# Patient Record
Sex: MALE | Race: BLACK OR AFRICAN AMERICAN | NOT HISPANIC OR LATINO | Employment: FULL TIME | URBAN - METROPOLITAN AREA
[De-identification: names, ages, dates, MRNs, and addresses within clinical notes are randomized per-mention and may not be internally consistent; named-entity substitution may affect disease eponyms.]

---

## 2018-08-28 ENCOUNTER — OFFICE VISIT (OUTPATIENT)
Dept: INTERNAL MEDICINE | Facility: CLINIC | Age: 43
End: 2018-08-28

## 2018-08-28 VITALS
SYSTOLIC BLOOD PRESSURE: 140 MMHG | OXYGEN SATURATION: 99 % | HEART RATE: 73 BPM | DIASTOLIC BLOOD PRESSURE: 80 MMHG | BODY MASS INDEX: 32.78 KG/M2 | HEIGHT: 66 IN | WEIGHT: 204 LBS

## 2018-08-28 DIAGNOSIS — R51.9 NEW ONSET OF HEADACHES: ICD-10-CM

## 2018-08-28 DIAGNOSIS — Z00.00 HEALTH CARE MAINTENANCE: Primary | ICD-10-CM

## 2018-08-28 DIAGNOSIS — Z82.49 FAMILY HISTORY OF BRAIN ANEURYSM: ICD-10-CM

## 2018-08-28 DIAGNOSIS — N52.9 ERECTILE DYSFUNCTION, UNSPECIFIED ERECTILE DYSFUNCTION TYPE: ICD-10-CM

## 2018-08-28 PROCEDURE — 99396 PREV VISIT EST AGE 40-64: CPT | Performed by: PHYSICIAN ASSISTANT

## 2018-08-28 NOTE — PROGRESS NOTES
"Chief Complaint   Patient presents with   • Establish Care       Subjective   Zuhair Jade is a 42 y.o. male.       History of Present Illness     The patient is being seen for a health maintenance evaluation.  The last health maintenance was unknown year(s) ago.    Social History  Zuhair  does not smoke cigarettes.   He drinks no alcohol. History of alcoholism, currently 4 years sober.   He does not use illicit drugs.    General History  Zuhair  does have regular dental visits.  He does complain of vision problems. Wears glasses and contacts.  Last eye exam was 2/2018  Immunizations are up to date. The patient needs the following immunizations: Had TDaP this year    Lifestyle  Zuhair  consumes a in general, an \"unhealthy\" diet.  He exercises intermittently.    Reproductive Health  Zuhair  is not sexually active. His contraceptive plan is condoms.   He does have erectile dysfunction. Has noticed that he has decreased libido, had caused problems with having relationship. Has issues keeping erection.    Screening  Last PSA was never.  Last prostate exam was  Never. No history of prostate cancer in family.  Last testicular exam was never.  Last colonoscopy was never. Grandmother had colonoscopy in her 50s or 60s. Mom has had colonoscopy.    Mother had brain aneurysm, he has been told it runs in his family. Mom had surgery 50s.    Notes that over the past few months he has had onset of  brief headaches on the side of his face, both sides. Concern d/t history of aneurysms in family. Lasts for a minute or so.              No current outpatient prescriptions on file.     [facility]  The following portions of the patient's history were reviewed and updated as appropriate: allergies, current medications, past family history, past medical history, past social history, past surgical history and problem list.    Review of Systems   Constitutional: Negative for appetite change, fever and unexpected weight change.   HENT: " "Negative for ear pain, facial swelling and sore throat.    Eyes: Negative for pain and visual disturbance.   Respiratory: Negative for chest tightness, shortness of breath and wheezing.    Cardiovascular: Negative for chest pain and palpitations.   Gastrointestinal: Negative for abdominal pain and blood in stool.   Endocrine: Negative.    Genitourinary: Negative for difficulty urinating and hematuria.   Musculoskeletal: Negative for joint swelling.   Neurological: Negative for tremors, seizures and syncope.   Hematological: Negative for adenopathy.   Psychiatric/Behavioral: Negative.        Objective   /80   Pulse 73   Ht 168 cm (66.14\")   Wt 92.5 kg (204 lb)   SpO2 99%   BMI 32.79 kg/m²     Physical Exam   Constitutional: He is oriented to person, place, and time. He appears well-developed and well-nourished. No distress.   HENT:   Head: Normocephalic and atraumatic. Hair is normal.   Right Ear: Hearing, tympanic membrane, external ear and ear canal normal.   Left Ear: Hearing, tympanic membrane, external ear and ear canal normal.   Nose: No sinus tenderness or nasal deformity.   Mouth/Throat: Uvula is midline, oropharynx is clear and moist and mucous membranes are normal. No oral lesions. No uvula swelling.   Eyes: Pupils are equal, round, and reactive to light. Conjunctivae, EOM and lids are normal. Right eye exhibits no discharge. Left eye exhibits no discharge. No scleral icterus. Right eye exhibits normal extraocular motion and no nystagmus. Left eye exhibits normal extraocular motion and no nystagmus.   Fundoscopic exam:       The right eye shows red reflex.        The left eye shows red reflex.   Neck: Normal range of motion. Neck supple. No JVD present. No tracheal deviation present. No thyromegaly present.   Cardiovascular: Normal rate, regular rhythm, normal heart sounds, intact distal pulses and normal pulses.  Exam reveals no gallop.    No murmur heard.  Pulmonary/Chest: Effort normal and " breath sounds normal. No respiratory distress. He has no wheezes. He has no rales. He exhibits no tenderness.   Abdominal: Soft. Bowel sounds are normal. He exhibits no distension and no mass. There is no tenderness. There is no guarding. No hernia.   Genitourinary: Rectum normal and prostate normal.   Musculoskeletal: Normal range of motion. He exhibits no edema, tenderness or deformity.   Lymphadenopathy:     He has no cervical adenopathy.   Neurological: He is alert and oriented to person, place, and time. He has normal reflexes. He displays normal reflexes. No cranial nerve deficit. He exhibits normal muscle tone. Coordination normal.   Skin: Skin is warm and dry. No rash noted. He is not diaphoretic.   Psychiatric: He has a normal mood and affect. His behavior is normal. Judgment and thought content normal.   Nursing note and vitals reviewed.      No results found for this or any previous visit.     ASSESSMENT/PLAN    Problem List Items Addressed This Visit        Other    Health care maintenance - Primary     Immunizations: up to date  Eye exam: done 2/2018  Prostate exam: done today  PSA: ordered  Colonoscopy: due age 43  Labs: fasting labs ordered           Relevant Orders    CBC & Differential    Comprehensive Metabolic Panel    Lipid Panel    TSH    Vitamin D 25 Hydroxy      Other Visit Diagnoses     Erectile dysfunction, unspecified erectile dysfunction type        Relevant Orders    Testosterone    New onset of headaches        Relevant Orders    MRI Brain With & Without Contrast    Family history of brain aneurysm        Relevant Orders    MRI Brain With & Without Contrast               Return in about 1 year (around 8/28/2019) for Annual.

## 2018-08-30 PROBLEM — Z00.00 HEALTH CARE MAINTENANCE: Status: ACTIVE | Noted: 2018-08-30

## 2018-09-04 ENCOUNTER — HOSPITAL ENCOUNTER (OUTPATIENT)
Dept: MRI IMAGING | Facility: HOSPITAL | Age: 43
Discharge: HOME | End: 2018-09-04
Admitting: PHYSICIAN ASSISTANT

## 2018-09-04 DIAGNOSIS — Z82.49 FAMILY HISTORY OF BRAIN ANEURYSM: ICD-10-CM

## 2018-09-04 DIAGNOSIS — R51.9 NEW ONSET OF HEADACHES: ICD-10-CM

## 2018-09-04 PROCEDURE — 0 GADOBENATE DIMEGLUMINE 529 MG/ML SOLUTION: Performed by: PHYSICIAN ASSISTANT

## 2018-09-04 PROCEDURE — 70553 MRI BRAIN STEM W/O & W/DYE: CPT

## 2018-09-04 PROCEDURE — A9577 INJ MULTIHANCE: HCPCS | Performed by: PHYSICIAN ASSISTANT

## 2018-09-04 RX ADMIN — GADOBENATE DIMEGLUMINE 19 ML: 529 INJECTION, SOLUTION INTRAVENOUS at 15:28

## 2018-09-27 ENCOUNTER — LAB (OUTPATIENT)
Dept: INTERNAL MEDICINE | Facility: CLINIC | Age: 43
End: 2018-09-27

## 2018-09-27 DIAGNOSIS — Z00.00 HEALTH CARE MAINTENANCE: ICD-10-CM

## 2018-09-27 DIAGNOSIS — N52.9 ERECTILE DYSFUNCTION, UNSPECIFIED ERECTILE DYSFUNCTION TYPE: ICD-10-CM

## 2018-09-27 LAB
25(OH)D3 SERPL-MCNC: 19.5 NG/ML
ALBUMIN SERPL-MCNC: 4.18 G/DL (ref 3.2–4.8)
ALBUMIN/GLOB SERPL: 2.4 G/DL (ref 1.5–2.5)
ALP SERPL-CCNC: 86 U/L (ref 25–100)
ALT SERPL W P-5'-P-CCNC: 49 U/L (ref 7–40)
ANION GAP SERPL CALCULATED.3IONS-SCNC: 10 MMOL/L (ref 3–11)
ARTICHOKE IGE QN: 150 MG/DL (ref 0–130)
AST SERPL-CCNC: 24 U/L (ref 0–33)
BASOPHILS # BLD AUTO: 0.02 10*3/MM3 (ref 0–0.2)
BASOPHILS NFR BLD AUTO: 0.3 % (ref 0–1)
BILIRUB SERPL-MCNC: 0.3 MG/DL (ref 0.3–1.2)
BUN BLD-MCNC: 24 MG/DL (ref 9–23)
BUN/CREAT SERPL: 19.8 (ref 7–25)
CALCIUM SPEC-SCNC: 9 MG/DL (ref 8.7–10.4)
CHLORIDE SERPL-SCNC: 102 MMOL/L (ref 99–109)
CHOLEST SERPL-MCNC: 208 MG/DL (ref 0–200)
CO2 SERPL-SCNC: 26 MMOL/L (ref 20–31)
CREAT BLD-MCNC: 1.21 MG/DL (ref 0.6–1.3)
DEPRECATED RDW RBC AUTO: 43.4 FL (ref 37–54)
EOSINOPHIL # BLD AUTO: 0.07 10*3/MM3 (ref 0–0.3)
EOSINOPHIL NFR BLD AUTO: 1.2 % (ref 0–3)
ERYTHROCYTE [DISTWIDTH] IN BLOOD BY AUTOMATED COUNT: 14.5 % (ref 11.3–14.5)
GFR SERPL CREATININE-BSD FRML MDRD: 79 ML/MIN/1.73
GLOBULIN UR ELPH-MCNC: 1.7 GM/DL
GLUCOSE BLD-MCNC: 112 MG/DL (ref 70–100)
HCT VFR BLD AUTO: 44.5 % (ref 38.9–50.9)
HDLC SERPL-MCNC: 47 MG/DL (ref 40–60)
HGB BLD-MCNC: 14.6 G/DL (ref 13.1–17.5)
IMM GRANULOCYTES # BLD: 0.01 10*3/MM3 (ref 0–0.03)
IMM GRANULOCYTES NFR BLD: 0.2 % (ref 0–0.6)
LYMPHOCYTES # BLD AUTO: 2.27 10*3/MM3 (ref 0.6–4.8)
LYMPHOCYTES NFR BLD AUTO: 38 % (ref 24–44)
MCH RBC QN AUTO: 27 PG (ref 27–31)
MCHC RBC AUTO-ENTMCNC: 32.8 G/DL (ref 32–36)
MCV RBC AUTO: 82.4 FL (ref 80–99)
MONOCYTES # BLD AUTO: 0.72 10*3/MM3 (ref 0–1)
MONOCYTES NFR BLD AUTO: 12.1 % (ref 0–12)
NEUTROPHILS # BLD AUTO: 2.89 10*3/MM3 (ref 1.5–8.3)
NEUTROPHILS NFR BLD AUTO: 48.4 % (ref 41–71)
PLATELET # BLD AUTO: 276 10*3/MM3 (ref 150–450)
PMV BLD AUTO: 9.1 FL (ref 6–12)
POTASSIUM BLD-SCNC: 4.6 MMOL/L (ref 3.5–5.5)
PROT SERPL-MCNC: 5.9 G/DL (ref 5.7–8.2)
RBC # BLD AUTO: 5.4 10*6/MM3 (ref 4.2–5.76)
SODIUM BLD-SCNC: 138 MMOL/L (ref 132–146)
TESTOST SERPL-MCNC: 288.6 NG/DL (ref 123.06–813.86)
TRIGL SERPL-MCNC: 117 MG/DL (ref 0–150)
TSH SERPL DL<=0.05 MIU/L-ACNC: 2.69 MIU/ML (ref 0.35–5.35)
WBC NRBC COR # BLD: 5.97 10*3/MM3 (ref 3.5–10.8)

## 2018-09-27 PROCEDURE — 84443 ASSAY THYROID STIM HORMONE: CPT | Performed by: PHYSICIAN ASSISTANT

## 2018-09-27 PROCEDURE — 85025 COMPLETE CBC W/AUTO DIFF WBC: CPT | Performed by: PHYSICIAN ASSISTANT

## 2018-09-27 PROCEDURE — 82306 VITAMIN D 25 HYDROXY: CPT | Performed by: PHYSICIAN ASSISTANT

## 2018-09-27 PROCEDURE — 84403 ASSAY OF TOTAL TESTOSTERONE: CPT | Performed by: PHYSICIAN ASSISTANT

## 2018-09-27 PROCEDURE — 80053 COMPREHEN METABOLIC PANEL: CPT | Performed by: PHYSICIAN ASSISTANT

## 2018-09-27 PROCEDURE — 80061 LIPID PANEL: CPT | Performed by: PHYSICIAN ASSISTANT

## 2018-10-03 NOTE — PROGRESS NOTES
Your labs show that you were mildly dehydrated when they were drawn- probably from fasting. Make sure you are drinking plenty of water.    Your cholesterol was also elevated. Please decrease the carbohydrates in your diet and increase exercise. If it remains elevated, we will need to start some cholesterol medication.    Your vitamin D is low. Please start over-the-counter vitamin D 3,000 units to boost your level.    Everything else looks fine!

## 2019-09-02 ENCOUNTER — TELEPHONE (OUTPATIENT)
Dept: INTERNAL MEDICINE | Facility: CLINIC | Age: 44
End: 2019-09-02

## 2019-09-03 ENCOUNTER — PATIENT MESSAGE (OUTPATIENT)
Dept: INTERNAL MEDICINE | Facility: CLINIC | Age: 44
End: 2019-09-03

## 2023-02-07 ENCOUNTER — APPOINTMENT (OUTPATIENT)
Dept: GENERAL RADIOLOGY | Facility: HOSPITAL | Age: 48
End: 2023-02-07
Payer: MEDICAID

## 2023-02-07 ENCOUNTER — HOSPITAL ENCOUNTER (OUTPATIENT)
Facility: HOSPITAL | Age: 48
Setting detail: OBSERVATION
Discharge: HOME | End: 2023-02-09
Attending: EMERGENCY MEDICINE | Admitting: INTERNAL MEDICINE
Payer: MEDICAID

## 2023-02-07 DIAGNOSIS — F10.10 ALCOHOL ABUSE: ICD-10-CM

## 2023-02-07 DIAGNOSIS — R07.9 CHEST PAIN, UNSPECIFIED TYPE: Primary | ICD-10-CM

## 2023-02-07 DIAGNOSIS — F10.930 ALCOHOL WITHDRAWAL SYNDROME WITHOUT COMPLICATION: ICD-10-CM

## 2023-02-07 PROBLEM — K22.89 ESOPHAGEAL PAIN: Status: ACTIVE | Noted: 2023-02-07

## 2023-02-07 PROBLEM — R07.89 CHEST PAIN, ATYPICAL: Status: ACTIVE | Noted: 2023-02-07

## 2023-02-07 PROBLEM — F10.939 ALCOHOL WITHDRAWAL (HCC): Status: ACTIVE | Noted: 2023-02-07

## 2023-02-07 PROBLEM — F10.20 ALCOHOL DEPENDENCE: Status: ACTIVE | Noted: 2023-02-07

## 2023-02-07 LAB
ALBUMIN SERPL-MCNC: 4.3 G/DL (ref 3.5–5.2)
ALBUMIN/GLOB SERPL: 2 G/DL
ALP SERPL-CCNC: 117 U/L (ref 39–117)
ALT SERPL W P-5'-P-CCNC: 43 U/L (ref 1–41)
AMPHET+METHAMPHET UR QL: NEGATIVE
ANION GAP SERPL CALCULATED.3IONS-SCNC: 11 MMOL/L (ref 5–15)
AST SERPL-CCNC: 46 U/L (ref 1–40)
BARBITURATES UR QL SCN: NEGATIVE
BASOPHILS # BLD MANUAL: 0.05 10*3/MM3 (ref 0–0.2)
BASOPHILS NFR BLD MANUAL: 1 % (ref 0–1.5)
BENZODIAZ UR QL SCN: NEGATIVE
BILIRUB SERPL-MCNC: 0.2 MG/DL (ref 0–1.2)
BUN SERPL-MCNC: 20 MG/DL (ref 6–20)
BUN/CREAT SERPL: 17.4 (ref 7–25)
CALCIUM SPEC-SCNC: 8.9 MG/DL (ref 8.6–10.5)
CANNABINOIDS SERPL QL: NEGATIVE
CHLORIDE SERPL-SCNC: 108 MMOL/L (ref 98–107)
CO2 SERPL-SCNC: 26 MMOL/L (ref 22–29)
COCAINE UR QL: NEGATIVE
CREAT SERPL-MCNC: 1.15 MG/DL (ref 0.76–1.27)
D DIMER PPP FEU-MCNC: 0.37 MCGFEU/ML (ref 0–0.5)
DEPRECATED RDW RBC AUTO: 52.6 FL (ref 37–54)
EGFRCR SERPLBLD CKD-EPI 2021: 78 ML/MIN/1.73
ERYTHROCYTE [DISTWIDTH] IN BLOOD BY AUTOMATED COUNT: 16.9 % (ref 12.3–15.4)
ETHANOL BLD-MCNC: 336 MG/DL (ref 0–10)
ETHANOL UR QL: 0.34 %
GEN 5 2HR TROPONIN T REFLEX: 12 NG/L
GLOBULIN UR ELPH-MCNC: 2.2 GM/DL
GLUCOSE BLDC GLUCOMTR-MCNC: 183 MG/DL (ref 70–130)
GLUCOSE BLDC GLUCOMTR-MCNC: 88 MG/DL (ref 70–130)
GLUCOSE SERPL-MCNC: 152 MG/DL (ref 65–99)
HAV IGM SERPL QL IA: NORMAL
HBA1C MFR BLD: 8.6 % (ref 4.8–5.6)
HBV CORE IGM SERPL QL IA: NORMAL
HBV SURFACE AG SERPL QL IA: NORMAL
HCT VFR BLD AUTO: 44.5 % (ref 37.5–51)
HCV AB SER DONR QL: NORMAL
HGB BLD-MCNC: 14.5 G/DL (ref 13–17.7)
HIV1+2 AB SER QL: NORMAL
LYMPHOCYTES # BLD MANUAL: 2.18 10*3/MM3 (ref 0.7–3.1)
LYMPHOCYTES NFR BLD MANUAL: 8.2 % (ref 5–12)
MAGNESIUM SERPL-MCNC: 2.4 MG/DL (ref 1.6–2.6)
MCH RBC QN AUTO: 27.7 PG (ref 26.6–33)
MCHC RBC AUTO-ENTMCNC: 32.6 G/DL (ref 31.5–35.7)
MCV RBC AUTO: 85.1 FL (ref 79–97)
METHADONE UR QL SCN: NEGATIVE
MONOCYTES # BLD: 0.37 10*3/MM3 (ref 0.1–0.9)
NEUTROPHILS # BLD AUTO: 1.95 10*3/MM3 (ref 1.7–7)
NEUTROPHILS NFR BLD MANUAL: 42.9 % (ref 42.7–76)
OPIATES UR QL: NEGATIVE
OXYCODONE UR QL SCN: NEGATIVE
PLAT MORPH BLD: NORMAL
PLATELET # BLD AUTO: 317 10*3/MM3 (ref 140–450)
PMV BLD AUTO: 8.7 FL (ref 6–12)
POTASSIUM SERPL-SCNC: 3.8 MMOL/L (ref 3.5–5.2)
PROT SERPL-MCNC: 6.5 G/DL (ref 6–8.5)
QT INTERVAL: 333 MS
RBC # BLD AUTO: 5.23 10*6/MM3 (ref 4.14–5.8)
RBC MORPH BLD: NORMAL
SMUDGE CELLS BLD QL SMEAR: ABNORMAL
SODIUM SERPL-SCNC: 145 MMOL/L (ref 136–145)
TROPONIN T DELTA: -1 NG/L
TROPONIN T SERPL HS-MCNC: 13 NG/L
TROPONIN T SERPL HS-MCNC: 8 NG/L
VARIANT LYMPHS NFR BLD MANUAL: 48 % (ref 19.6–45.3)
WBC NRBC COR # BLD: 4.55 10*3/MM3 (ref 3.4–10.8)

## 2023-02-07 PROCEDURE — 36415 COLL VENOUS BLD VENIPUNCTURE: CPT | Performed by: INTERNAL MEDICINE

## 2023-02-07 PROCEDURE — 80307 DRUG TEST PRSMV CHEM ANLYZR: CPT | Performed by: EMERGENCY MEDICINE

## 2023-02-07 PROCEDURE — 80074 ACUTE HEPATITIS PANEL: CPT | Performed by: INTERNAL MEDICINE

## 2023-02-07 PROCEDURE — 99204 OFFICE O/P NEW MOD 45 MIN: CPT | Performed by: INTERNAL MEDICINE

## 2023-02-07 PROCEDURE — 85379 FIBRIN DEGRADATION QUANT: CPT | Performed by: INTERNAL MEDICINE

## 2023-02-07 PROCEDURE — G0378 HOSPITAL OBSERVATION PER HR: HCPCS

## 2023-02-07 PROCEDURE — 71045 X-RAY EXAM CHEST 1 VIEW: CPT

## 2023-02-07 PROCEDURE — 25010000002 LORAZEPAM PER 2 MG: Performed by: EMERGENCY MEDICINE

## 2023-02-07 PROCEDURE — 85025 COMPLETE CBC W/AUTO DIFF WBC: CPT | Performed by: EMERGENCY MEDICINE

## 2023-02-07 PROCEDURE — 84484 ASSAY OF TROPONIN QUANT: CPT | Performed by: INTERNAL MEDICINE

## 2023-02-07 PROCEDURE — 99285 EMERGENCY DEPT VISIT HI MDM: CPT

## 2023-02-07 PROCEDURE — 25010000002 THIAMINE PER 100 MG: Performed by: INTERNAL MEDICINE

## 2023-02-07 PROCEDURE — 80053 COMPREHEN METABOLIC PANEL: CPT | Performed by: EMERGENCY MEDICINE

## 2023-02-07 PROCEDURE — 82962 GLUCOSE BLOOD TEST: CPT

## 2023-02-07 PROCEDURE — 93010 ELECTROCARDIOGRAM REPORT: CPT | Performed by: INTERNAL MEDICINE

## 2023-02-07 PROCEDURE — 96375 TX/PRO/DX INJ NEW DRUG ADDON: CPT

## 2023-02-07 PROCEDURE — 83036 HEMOGLOBIN GLYCOSYLATED A1C: CPT | Performed by: INTERNAL MEDICINE

## 2023-02-07 PROCEDURE — 93005 ELECTROCARDIOGRAM TRACING: CPT

## 2023-02-07 PROCEDURE — 83735 ASSAY OF MAGNESIUM: CPT | Performed by: EMERGENCY MEDICINE

## 2023-02-07 PROCEDURE — 85007 BL SMEAR W/DIFF WBC COUNT: CPT | Performed by: EMERGENCY MEDICINE

## 2023-02-07 PROCEDURE — 84484 ASSAY OF TROPONIN QUANT: CPT | Performed by: EMERGENCY MEDICINE

## 2023-02-07 PROCEDURE — 82077 ASSAY SPEC XCP UR&BREATH IA: CPT | Performed by: EMERGENCY MEDICINE

## 2023-02-07 PROCEDURE — G0432 EIA HIV-1/HIV-2 SCREEN: HCPCS | Performed by: INTERNAL MEDICINE

## 2023-02-07 RX ORDER — DEXTROSE MONOHYDRATE 25 G/50ML
25 INJECTION, SOLUTION INTRAVENOUS
Status: DISCONTINUED | OUTPATIENT
Start: 2023-02-07 | End: 2023-02-09 | Stop reason: HOSPADM

## 2023-02-07 RX ORDER — INSULIN LISPRO 100 [IU]/ML
0-9 INJECTION, SOLUTION INTRAVENOUS; SUBCUTANEOUS
Status: DISCONTINUED | OUTPATIENT
Start: 2023-02-07 | End: 2023-02-09 | Stop reason: HOSPADM

## 2023-02-07 RX ORDER — AMLODIPINE BESYLATE 5 MG/1
5 TABLET ORAL
Status: DISCONTINUED | OUTPATIENT
Start: 2023-02-08 | End: 2023-02-09 | Stop reason: HOSPADM

## 2023-02-07 RX ORDER — LORAZEPAM 1 MG/1
1 TABLET ORAL EVERY 8 HOURS
Status: COMPLETED | OUTPATIENT
Start: 2023-02-08 | End: 2023-02-09

## 2023-02-07 RX ORDER — SUCRALFATE 1 G/1
1 TABLET ORAL
Status: DISCONTINUED | OUTPATIENT
Start: 2023-02-07 | End: 2023-02-09 | Stop reason: HOSPADM

## 2023-02-07 RX ORDER — FOLIC ACID 1 MG/1
1 TABLET ORAL DAILY
Status: DISCONTINUED | OUTPATIENT
Start: 2023-02-08 | End: 2023-02-09 | Stop reason: HOSPADM

## 2023-02-07 RX ORDER — FAMOTIDINE 20 MG/1
20 TABLET, FILM COATED ORAL 2 TIMES DAILY PRN
Status: DISCONTINUED | OUTPATIENT
Start: 2023-02-07 | End: 2023-02-09 | Stop reason: HOSPADM

## 2023-02-07 RX ORDER — NICOTINE POLACRILEX 4 MG
15 LOZENGE BUCCAL
Status: DISCONTINUED | OUTPATIENT
Start: 2023-02-07 | End: 2023-02-09 | Stop reason: HOSPADM

## 2023-02-07 RX ORDER — AMLODIPINE BESYLATE AND ATORVASTATIN CALCIUM 10; 10 MG/1; MG/1
1 TABLET, FILM COATED ORAL DAILY
COMMUNITY

## 2023-02-07 RX ORDER — LORAZEPAM 2 MG/ML
2 INJECTION INTRAMUSCULAR
Status: DISCONTINUED | OUTPATIENT
Start: 2023-02-07 | End: 2023-02-09 | Stop reason: HOSPADM

## 2023-02-07 RX ORDER — SODIUM CHLORIDE 0.9 % (FLUSH) 0.9 %
3-10 SYRINGE (ML) INJECTION AS NEEDED
Status: DISCONTINUED | OUTPATIENT
Start: 2023-02-07 | End: 2023-02-09 | Stop reason: HOSPADM

## 2023-02-07 RX ORDER — PANTOPRAZOLE SODIUM 40 MG/1
40 TABLET, DELAYED RELEASE ORAL
Status: DISCONTINUED | OUTPATIENT
Start: 2023-02-07 | End: 2023-02-09 | Stop reason: HOSPADM

## 2023-02-07 RX ORDER — LORAZEPAM 1 MG/1
2 TABLET ORAL
Status: DISCONTINUED | OUTPATIENT
Start: 2023-02-07 | End: 2023-02-09 | Stop reason: HOSPADM

## 2023-02-07 RX ORDER — ATORVASTATIN CALCIUM 20 MG/1
10 TABLET, FILM COATED ORAL DAILY
Status: DISCONTINUED | OUTPATIENT
Start: 2023-02-08 | End: 2023-02-09 | Stop reason: HOSPADM

## 2023-02-07 RX ORDER — LORAZEPAM 2 MG/ML
1 INJECTION INTRAMUSCULAR
Status: DISCONTINUED | OUTPATIENT
Start: 2023-02-07 | End: 2023-02-09 | Stop reason: HOSPADM

## 2023-02-07 RX ORDER — IBUPROFEN 600 MG/1
1 TABLET ORAL
Status: DISCONTINUED | OUTPATIENT
Start: 2023-02-07 | End: 2023-02-09 | Stop reason: HOSPADM

## 2023-02-07 RX ORDER — LORAZEPAM 2 MG/ML
1 INJECTION INTRAMUSCULAR ONCE
Status: COMPLETED | OUTPATIENT
Start: 2023-02-07 | End: 2023-02-07

## 2023-02-07 RX ORDER — OMEPRAZOLE 20 MG/1
1 CAPSULE, DELAYED RELEASE ORAL DAILY
COMMUNITY
End: 2023-02-09 | Stop reason: HOSPADM

## 2023-02-07 RX ORDER — TRAMADOL HYDROCHLORIDE 50 MG/1
25 TABLET ORAL EVERY 6 HOURS PRN
Status: DISCONTINUED | OUTPATIENT
Start: 2023-02-07 | End: 2023-02-09 | Stop reason: HOSPADM

## 2023-02-07 RX ORDER — ONDANSETRON 2 MG/ML
4 INJECTION INTRAMUSCULAR; INTRAVENOUS EVERY 6 HOURS PRN
Status: DISCONTINUED | OUTPATIENT
Start: 2023-02-07 | End: 2023-02-09 | Stop reason: HOSPADM

## 2023-02-07 RX ORDER — ONDANSETRON 4 MG/1
4 TABLET, FILM COATED ORAL EVERY 6 HOURS PRN
Status: DISCONTINUED | OUTPATIENT
Start: 2023-02-07 | End: 2023-02-09 | Stop reason: HOSPADM

## 2023-02-07 RX ORDER — UREA 10 %
3 LOTION (ML) TOPICAL NIGHTLY PRN
Status: DISCONTINUED | OUTPATIENT
Start: 2023-02-07 | End: 2023-02-09 | Stop reason: HOSPADM

## 2023-02-07 RX ORDER — SODIUM CHLORIDE 9 MG/ML
40 INJECTION, SOLUTION INTRAVENOUS AS NEEDED
Status: DISCONTINUED | OUTPATIENT
Start: 2023-02-07 | End: 2023-02-09 | Stop reason: HOSPADM

## 2023-02-07 RX ORDER — SODIUM CHLORIDE 0.9 % (FLUSH) 0.9 %
3 SYRINGE (ML) INJECTION EVERY 12 HOURS SCHEDULED
Status: DISCONTINUED | OUTPATIENT
Start: 2023-02-07 | End: 2023-02-09 | Stop reason: HOSPADM

## 2023-02-07 RX ORDER — DIPHENOXYLATE HYDROCHLORIDE AND ATROPINE SULFATE 2.5; .025 MG/1; MG/1
1 TABLET ORAL DAILY
Status: DISCONTINUED | OUTPATIENT
Start: 2023-02-08 | End: 2023-02-09 | Stop reason: HOSPADM

## 2023-02-07 RX ORDER — DOCUSATE SODIUM 100 MG/1
100 CAPSULE, LIQUID FILLED ORAL 2 TIMES DAILY PRN
Status: DISCONTINUED | OUTPATIENT
Start: 2023-02-07 | End: 2023-02-09 | Stop reason: HOSPADM

## 2023-02-07 RX ORDER — LORAZEPAM 1 MG/1
1 TABLET ORAL EVERY 6 HOURS
Status: COMPLETED | OUTPATIENT
Start: 2023-02-07 | End: 2023-02-08

## 2023-02-07 RX ORDER — SODIUM CHLORIDE 0.9 % (FLUSH) 0.9 %
10 SYRINGE (ML) INJECTION AS NEEDED
Status: DISCONTINUED | OUTPATIENT
Start: 2023-02-07 | End: 2023-02-09 | Stop reason: HOSPADM

## 2023-02-07 RX ORDER — LORAZEPAM 1 MG/1
1 TABLET ORAL
Status: DISCONTINUED | OUTPATIENT
Start: 2023-02-07 | End: 2023-02-09 | Stop reason: HOSPADM

## 2023-02-07 RX ADMIN — SUCRALFATE 1 G: 1 TABLET ORAL at 20:05

## 2023-02-07 RX ADMIN — Medication 3 ML: at 16:37

## 2023-02-07 RX ADMIN — LORAZEPAM 1 MG: 1 TABLET ORAL at 16:36

## 2023-02-07 RX ADMIN — THIAMINE HYDROCHLORIDE 300 MG: 100 INJECTION, SOLUTION INTRAMUSCULAR; INTRAVENOUS at 21:08

## 2023-02-07 RX ADMIN — LORAZEPAM 1 MG: 2 INJECTION INTRAMUSCULAR; INTRAVENOUS at 13:22

## 2023-02-07 RX ADMIN — Medication 3 ML: at 20:06

## 2023-02-07 RX ADMIN — SUCRALFATE 1 G: 1 TABLET ORAL at 17:52

## 2023-02-07 RX ADMIN — PANTOPRAZOLE SODIUM 40 MG: 40 TABLET, DELAYED RELEASE ORAL at 16:36

## 2023-02-07 RX ADMIN — LORAZEPAM 1 MG: 1 TABLET ORAL at 20:05

## 2023-02-07 RX ADMIN — THIAMINE HYDROCHLORIDE 300 MG: 100 INJECTION, SOLUTION INTRAMUSCULAR; INTRAVENOUS at 17:52

## 2023-02-07 RX ADMIN — SODIUM CHLORIDE, POTASSIUM CHLORIDE, SODIUM LACTATE AND CALCIUM CHLORIDE 1000 ML: 600; 310; 30; 20 INJECTION, SOLUTION INTRAVENOUS at 09:40

## 2023-02-07 NOTE — ED NOTES
Pt arrives via EMS from home for chest pain for the past hour. Pt reports anxiety as well. Pain is worse with deep breaths. Pt denies SOA. Pt received 0.4 nitro and 325mg of aspirin in route

## 2023-02-07 NOTE — ED NOTES
Nursing report ED to floor  Zuhair Jade Jr.  47 y.o.  male    HPI :   Chief Complaint   Patient presents with    Chest Pain       Admitting doctor:   Bubba Mcmahon MD    Admitting diagnosis:   The primary encounter diagnosis was Chest pain, unspecified type. Diagnoses of Alcohol abuse and Alcohol withdrawal syndrome without complication (HCC) were also pertinent to this visit.    Code status:   Current Code Status       Date Active Code Status Order ID Comments User Context       Not on file            Allergies:   Patient has no known allergies.    Isolation:   No active isolations    Intake and Output  No intake or output data in the 24 hours ending 02/07/23 1351    Weight:       02/07/23  0919   Weight: 92.5 kg (204 lb)       Most recent vitals:   Vitals:    02/07/23 1131 02/07/23 1201 02/07/23 1245 02/07/23 1301   BP: 140/98 121/88 126/88 132/84   BP Location: Right arm  Left arm Left arm   Patient Position: Lying  Lying Lying   Pulse: 82 88 86 92   Resp: 18  18 18   Temp:       TempSrc:       SpO2: 100% 97% 99% 100%   Weight:       Height:           Active LDAs/IV Access:   Lines, Drains & Airways       Active LDAs       Name Placement date Placement time Site Days    Peripheral IV 02/07/23 0908 Right Antecubital 02/07/23  0908  Antecubital  less than 1                    Labs (abnormal labs have a star):   Labs Reviewed   COMPREHENSIVE METABOLIC PANEL - Abnormal; Notable for the following components:       Result Value    Glucose 152 (*)     Chloride 108 (*)     ALT (SGPT) 43 (*)     AST (SGOT) 46 (*)     All other components within normal limits    Narrative:     GFR Normal >60  Chronic Kidney Disease <60  Kidney Failure <15     ETHANOL - Abnormal; Notable for the following components:    Ethanol 336 (*)     All other components within normal limits   CBC WITH AUTO DIFFERENTIAL - Abnormal; Notable for the following components:    RDW 16.9 (*)     All other components within normal limits   MANUAL  DIFFERENTIAL - Abnormal; Notable for the following components:    Lymphocyte % 48.0 (*)     All other components within normal limits   TROPONIN - Normal    Narrative:     High Sensitive Troponin T Reference Range:  <10.0 ng/L- Negative Female for AMI  <15.0 ng/L- Negative Male for AMI  >=10 - Abnormal Female indicating possible myocardial injury.  >=15 - Abnormal Male indicating possible myocardial injury.   Clinicians would have to utilize clinical acumen, EKG, Troponin, and serial changes to determine if it is an Acute Myocaridal Infarction or myocardial injury due to an underlying chronic condition.        URINE DRUG SCREEN - Normal    Narrative:     Negative Thresholds Per Drugs Screened:    Amphetamines                 500 ng/ml  Barbiturates                 200 ng/ml  Benzodiazepines              100 ng/ml  Cocaine                      300 ng/ml  Methadone                    300 ng/ml  Opiates                      300 ng/ml  Oxycodone                    100 ng/ml  THC                           50 ng/ml    The Normal Value for all drugs tested is negative. This report includes final unconfirmed screening results to be used for medical treatment purposes only. Unconfirmed results must not be used for non-medical purposes such as employment or legal testing. Clinical consideration should be applied to any drug of abuse test, particularly when unconfirmed results are used.           MAGNESIUM - Normal   HIGH SENSITIVITIY TROPONIN T 2HR - Normal    Narrative:     High Sensitive Troponin T Reference Range:  <10.0 ng/L- Negative Female for AMI  <15.0 ng/L- Negative Male for AMI  >=10 - Abnormal Female indicating possible myocardial injury.  >=15 - Abnormal Male indicating possible myocardial injury.   Clinicians would have to utilize clinical acumen, EKG, Troponin, and serial changes to determine if it is an Acute Myocaridal Infarction or myocardial injury due to an underlying chronic condition.        HEMOGLOBIN  A1C   POCT GLUCOSE FINGERSTICK   POCT GLUCOSE FINGERSTICK   POCT GLUCOSE FINGERSTICK   CBC AND DIFFERENTIAL    Narrative:     The following orders were created for panel order CBC & Differential.  Procedure                               Abnormality         Status                     ---------                               -----------         ------                     CBC Auto Differential[[id number]]        Abnormal            Final result                 Please view results for these tests on the individual orders.       EKG:   ECG 12 Lead Chest Pain   Final Result   HEART RATE= 103  bpm   RR Interval= 583  ms   ME Interval= 137  ms   P Horizontal Axis= 15  deg   P Front Axis= 70  deg   QRSD Interval= 96  ms   QT Interval= 333  ms   QRS Axis= 73  deg   T Wave Axis= 46  deg   - ABNORMAL ECG -   Sinus tachycardia   Probable left ventricular hypertrophy   No Prior Tracing for Comparison   Electronically Signed By: Jose E Urena ([facility]) 07-Feb-2023 12:17:08   Date and Time of Study: 2023-02-07 09:14:57      SCANNED - TELEMETRY     Final Result      SCANNED - TELEMETRY     Final Result      SCANNED - TELEMETRY     Final Result          Meds given in ED:   Medications   sodium chloride 0.9 % flush 10 mL (has no administration in time range)   thiamine (B-1) 300 mg in sodium chloride 0.9 % 100 mL IVPB (has no administration in time range)   LORazepam (ATIVAN) tablet 1 mg (has no administration in time range)     Followed by   LORazepam (ATIVAN) tablet 1 mg (has no administration in time range)   LORazepam (ATIVAN) tablet 1 mg (has no administration in time range)     Or   LORazepam (ATIVAN) injection 1 mg (has no administration in time range)     Or   LORazepam (ATIVAN) tablet 2 mg (has no administration in time range)     Or   LORazepam (ATIVAN) injection 2 mg (has no administration in time range)     Or   LORazepam (ATIVAN) injection 2 mg (has no administration in time range)     Or   LORazepam (ATIVAN) injection 2 mg  (has no administration in time range)   thiamine (VITAMIN B-1) tablet 250 mg (has no administration in time range)     And   multivitamin (THERAGRAN) tablet 1 tablet (has no administration in time range)     And   folic acid (FOLVITE) tablet 1 mg (has no administration in time range)   sodium chloride 0.9 % flush 3 mL (has no administration in time range)   sodium chloride 0.9 % flush 3-10 mL (has no administration in time range)   sodium chloride 0.9 % infusion 40 mL (has no administration in time range)   famotidine (PEPCID) tablet 20 mg (has no administration in time range)   ondansetron (ZOFRAN) tablet 4 mg (has no administration in time range)     Or   ondansetron (ZOFRAN) injection 4 mg (has no administration in time range)   melatonin tablet 3 mg (has no administration in time range)   traMADol (ULTRAM) tablet 25 mg (has no administration in time range)   docusate sodium (COLACE) capsule 100 mg (has no administration in time range)   dextrose (GLUTOSE) oral gel 15 g (has no administration in time range)   dextrose (D50W) (25 g/50 mL) IV injection 25 g (has no administration in time range)   glucagon (GLUCAGEN) injection 1 mg (has no administration in time range)   insulin lispro (ADMELOG) injection 0-9 Units (has no administration in time range)   lactated ringers bolus 1,000 mL (0 mL Intravenous Stopped 2/7/23 1119)   LORazepam (ATIVAN) injection 1 mg (1 mg Intravenous Given 2/7/23 1322)       Imaging results:  XR Chest 1 View    Result Date: 2/7/2023  FINDINGS AND IMPRESSION: No pulmonary consolidation, pleural effusion or pneumothorax is seen. Cardiac silhouette is at the upper limits of normal to borderline enlarged.  This report was finalized on 2/7/2023 10:21 AM by Dr. Vipin Roque M.D.       Ambulatory status:   - Ax2    Social issues:   Social History     Socioeconomic History    Marital status: Single   Tobacco Use    Smoking status: Never    Smokeless tobacco: Never   Substance and Sexual Activity     Alcohol use: Yes     Comment: Pt says he drinks a pint of gin a day    Drug use: Never    Sexual activity: Defer       NIH Stroke Scale:         Jennie Meredith RN  02/07/23 13:51 EST

## 2023-02-07 NOTE — H&P
[facility] Medicine Services  HISTORY AND PHYSICAL    Patient Name: Zuhair Jade Jr.  : 1975  MRN: [id number]  Primary Care Physician: Nena Leggett PA  Date of admission: 2023    Subjective   Subjective   Chief Complaint:  Alcohol withdrawal and atypical chest pain    HPI:  Zuhair Jade Jr. is a 47 y.o. male presents the hospital with complaint of moderate intermittent substernal chest pain that is worse with swallowing food and with drinking alcohol.  He reports he has been drinking significant amounts of gin all throughout the day for several months since the death of his father.  He says he is trying to quit and notes he is having tremors.  He feels he is going through withdrawal and wishes to be sober.  He is interested in resources for sobriety.  He denies any NSAID use but does note he has chronic GERD.  He uses a PPI intermittently.  Chest pain is not worsened with exertion per report.      Review of Systems   Constitutional: Negative for chills and fever.   HENT: Negative.    Eyes: Negative.    Respiratory: Negative.    Cardiovascular: Negative for palpitations and leg swelling.   Gastrointestinal: Positive for abdominal pain. Negative for vomiting.   Endocrine: Negative.    Genitourinary: Negative.    Musculoskeletal: Negative.    Skin: Negative.    Allergic/Immunologic: Negative.    Neurological: Negative.    Hematological: Negative.         All other systems reviewed and are negative.     Personal History     Past Medical History:   Diagnosis Date   • Diabetes mellitus (HCC)        History reviewed. No pertinent surgical history.    Family History: family history includes Hypertension in his father. Other pertinent FHx was reviewed and unremarkable.     Social History:  reports that he has never smoked. He has never used smokeless tobacco. He reports current alcohol use. He reports that he does not use drugs.  Medications:  Available home medication information reviewed.    No Known  Allergies    Objective   Objective   Vital Signs:   Temp:  [98.3 °F (36.8 °C)] 98.3 °F (36.8 °C)  Heart Rate:  [] 84  Resp:  [18] 18  BP: (121-152)/(84-99) 152/96        Physical Exam   Constitutional: Awake, alert, nontoxic-appearing  Eyes: PERRLA, sclerae anicteric, no conjunctival injection  HENT: NCAT, mucous membranes moist  Neck: Supple, no thyromegaly, no lymphadenopathy, trachea midline  Respiratory: No cough or wheezing, nonlabored respirations   Cardiovascular: RRR rate is regular, palpable radial pulse bilaterally.     Gastrointestinal: Obese, BMI is 33,soft, nontender, nondistended  Musculoskeletal: No bilateral ankle edema, no clubbing or cyanosis to extremities  Psychiatric: Mildly anxious affect, cooperative  Neurologic: Oriented, conversational and pleasant, able to follow commands, no slurred speech or facial droop, strength symmetric in all extremities, Cranial Nerves grossly intact to confrontation, speech clear  Skin: No rashes or jaundice      Results from last 7 days   Lab Units 02/07/23  0939   WBC 10*3/mm3 4.55   HEMOGLOBIN g/dL 14.5   HEMATOCRIT % 44.5   PLATELETS 10*3/mm3 317     Results from last 7 days   Lab Units 02/07/23  1143 02/07/23  0939   SODIUM mmol/L  --  145   POTASSIUM mmol/L  --  3.8   CHLORIDE mmol/L  --  108*   CO2 mmol/L  --  26.0   BUN mg/dL  --  20   CREATININE mg/dL  --  1.15   GLUCOSE mg/dL  --  152*   CALCIUM mg/dL  --  8.9   ALT (SGPT) U/L  --  43*   AST (SGOT) U/L  --  46*   HSTROP T ng/L 12 13     Estimated Creatinine Clearance: 82.8 mL/min (by C-G formula based on SCr of 1.15 mg/dL).  Brief Urine Lab Results     None        Imaging Results (Last 24 Hours)     Procedure Component Value Units Date/Time    XR Chest 1 View [[id number]] Collected: 02/07/23 1019     Updated: 02/07/23 1024    Narrative:      Portable chest radiograph     HISTORY:Chest pain     TECHNIQUE: Single AP portable radiograph of the chest     COMPARISON:None       Impression:      FINDINGS  AND IMPRESSION:  No pulmonary consolidation, pleural effusion or pneumothorax is seen.  Cardiac silhouette is at the upper limits of normal to borderline  enlarged.     This report was finalized on 2/7/2023 10:21 AM by Dr. Vipin Roque M.D.               Assessment & Plan   Assessment & Plan     Active Hospital Problems    Diagnosis  POA   • **Alcohol withdrawal (HCC) [F10.939]  Yes   • Esophageal pain [K22.89]  Yes   • Chest pain, atypical [R07.89]  Yes   • Alcohol dependence (HCC) [F10.20]  Yes   • Diabetes mellitus (HCC) [E11.9]  Yes   • GERD (gastroesophageal reflux disease) [K21.9]  Yes   • HTN (hypertension) [I10]  Yes   • Obese [E66.9]  Yes     47-year-old male presents to the hospital with alcohol dependence and early alcohol withdrawal with atypical chest/esophageal pain.    Alcohol dependence with withdrawal:  Plan to admit and monitor for withdrawal protocol.  We will give him low-dose scheduled Ativan and as needed Ativan for breakthrough.  Give vitamins.  Patient had some recent confusion I will put him on therapeutic thiamine as he reports poor recent oral intake.  Consult access for resources for sobriety.  Patient claims he is motivated to quit.    Atypical chest pain/esophagitis:  Seems more gastrointestinal per history.  It is not worsened with exertion.  EKG reviewed and shows sinus rhythm without ST elevation or depression.  Troponin negative x2 thus far.  Repeat additional troponin.  Strong concern for esophagitis or ulcer related to drinking.  Consult gastroenterology to evaluate and if persists or worsens he may need EGD.    High risk sexual behavior:  Patient reports he recently had sexual contact and was told he was exposed to HIV.  Plan to test him for HIV and hepatitis per his request.    Diabetes: Monitor glucose and adjust insulin as needed.  Hold metformin at patient noted recent diarrhea.    GERD: Increase PPI to twice daily.    Hypertension: Decrease amlodipine to 5 mg while  acutely ill and monitor blood pressure and adjust as needed.    Obesity weight loss recommended.  BMI is 33.    Hyperlipidemia: Continue statin.    DVT prophylaxis: Low Padua score    CODE STATUS:  Full    Bubba Mcmahon MD  02/07/23

## 2023-02-07 NOTE — CONSULTS
"[facility]  Initial Inpatient Consult Note    Referring Provider: Dr. CURRY Mcmahon    Reason for Consultation: Heavy drinker, esophageal pain, drinks gin \"all day\", concern for ulcer or esophagitis?    Subjective     History of present illness:    47 y.o. male anderson with DM2, GERD, and alcoholism who was admitted today with stabbing chest pain and palpitations on and off x 2-3 mos that is worse when supine or when swallowing. He drinks >1 pint of gin/day and last had any ETOH this AM. He started drinking again in 3/22 when his dad  of colon cancer. He now lives with his stepmom. He has nausea and nonbloody vomiting. He is on one baby aspirin/day but denies other NSAIDs use. He is a nonsmoker. He has had diarrhea since he has been drinking ETOH (since 3/22). No consitpation, rectal bleeding or melena. He had an EGD yrs ago in [place]. He has never had a colonoscopy. He usually has GERD and takes prn  Omeprazole 20 mg/day prn. He has some dysphagia. He has lost about 20 pounds in the last one month. In the ER he was noted to have mildly elevated LFTs: TB 0.2, alk phos 117, ALT 43, AST 46, albumin 4.3.    Past Medical History:  Past Medical History:   Diagnosis Date   • Diabetes mellitus (HCC)      Past Surgical History:  History reviewed. No pertinent surgical history.   Social History:   Social History     Tobacco Use   • Smoking status: Never   • Smokeless tobacco: Never   Substance Use Topics   • Alcohol use: Yes     Comment: Pt says he drinks a pint of gin a day      Family History:  Family History   Problem Relation Age of Onset   • Aneurysm Mother    • Alcohol abuse Father    • Diabetes Father    • Hypertension Father        Home Meds:  Medications Prior to Admission   Medication Sig Dispense Refill Last Dose   • amLODIPine-atorvastatin (CADUET) 10-10 MG per tablet Take 1 tablet by mouth Daily.   2023   • omeprazole (priLOSEC) 20 MG capsule Take 1 capsule by mouth Daily.   2023 "   • metFORMIN (GLUCOPHAGE) 500 MG tablet Take 1 tablet by mouth Every 12 (Twelve) Hours.   More than a month     Current Meds:   [START ON 2/8/2023] amLODIPine, 5 mg, Oral, Q24H  [START ON 2/8/2023] atorvastatin, 10 mg, Oral, Daily  [START ON 2/11/2023] thiamine, 250 mg, Oral, Daily   And  [START ON 2/8/2023] multivitamin, 1 tablet, Oral, Daily   And  [START ON 2/8/2023] folic acid, 1 mg, Oral, Daily  insulin lispro, 0-9 Units, Subcutaneous, TID AC  LORazepam, 1 mg, Oral, Q6H   Followed by  [START ON 2/8/2023] LORazepam, 1 mg, Oral, Q8H  pantoprazole, 40 mg, Oral, BID AC  sodium chloride, 3 mL, Intravenous, Q12H  thiamine (VITAMIN B1) IVPB, 300 mg, Intravenous, Q8H      Allergies:  No Known Allergies  Review of Systems  The following systems were reviewed and negative;  constitution, respiratory, musculoskeletal and neurological     Objective     Vital Signs  Temp:  [98.3 °F (36.8 °C)] 98.3 °F (36.8 °C)  Heart Rate:  [] 84  Resp:  [18] 18  BP: (121-152)/(84-99) 152/96  Physical Exam:  General Appearance:    Alert, cooperative, in no acute distress   Head:    Normocephalic, without obvious abnormality, atraumatic   Eyes:            Lids and lashes normal, conjunctivae and sclerae normal, no   icterus   Throat:   No oral lesions, no thrush, oral mucosa moist   Neck:   No adenopathy, supple, trachea midline, no thyromegaly, no   carotid bruit, no JVD   Lungs:     Clear to auscultation,respirations regular, even and                   unlabored    Heart:    Regular rhythm and normal rate, normal S1 and S2, no            murmur, no gallop, no rub, no click   Chest Wall:    No abnormalities observed   Abdomen:     Normal bowel sounds, no masses, no organomegaly, soft        nontender, nondistended, no guarding, no rebound                 tenderness   Rectal:     Deferred   Extremities:   no edema, no cyanosis, no redness   Skin:   No bleeding, bruising or rash   Lymph nodes:   No palpable adenopathy   Psychiatric:   Judgement and insight: normal   Orientation to person place and time: normal   Mood and affect: normal   Results Review:   I reviewed the patient's new clinical results.    Results from last 7 days   Lab Units 02/07/23  0939   WBC 10*3/mm3 4.55   HEMOGLOBIN g/dL 14.5   HEMATOCRIT % 44.5   PLATELETS 10*3/mm3 317     Results from last 7 days   Lab Units 02/07/23  0939   SODIUM mmol/L 145   POTASSIUM mmol/L 3.8   CHLORIDE mmol/L 108*   CO2 mmol/L 26.0   BUN mg/dL 20   CREATININE mg/dL 1.15   CALCIUM mg/dL 8.9   BILIRUBIN mg/dL 0.2   ALK PHOS U/L 117   ALT (SGPT) U/L 43*   AST (SGOT) U/L 46*   GLUCOSE mg/dL 152*         No results found for: LIPASE    Radiology:  XR Chest 1 View   Final Result   FINDINGS AND IMPRESSION:   No pulmonary consolidation, pleural effusion or pneumothorax is seen.   Cardiac silhouette is at the upper limits of normal to borderline   enlarged.       This report was finalized on 2/7/2023 10:21 AM by Dr. Vipin Roque M.D.              Assessment & Plan   Assessment:   1. Alcoholism with acute intoxication  2. Alcohol withdrawal  3. Elevated LFT's  4. Chest pain and odynaphagia  5. H/o GERD and with dysphagia.  6. Weight loss  7. FH (dad diagnosed at 54 yrs of age) colon cancer. The patient has never had a colonoscopy.      Plan:   -  Check a hepatitis profile.  - Stop drinking ETOH  - Treat alcohol withdrawal.  - Protonix and Carafate elixir  - Check amylase, lipase, troponin (again)  - Check US of the liver and gallbladder.  - He should go thru ETOH withdrawal  - Consider having a Psychiatrist see him.   - Check D-dimer  - Check a CT abd/pelvis  - Consider a barium swallow.  -  He should have an outpatient EGD and colonoscopy.  - Check TSH.  - I agree with thiamine and MVI.    I discussed the patient's findings and my recommendations with patient and nursing staff.         Colby Aguila M.D.  [facility]  [address] [place] [address] - [address]  [place] [address]  Office:  [phone]

## 2023-02-07 NOTE — ED PROVIDER NOTES
" EMERGENCY DEPARTMENT ENCOUNTER    Room Number:  39/39  Date seen:  2/7/2023  PCP: Nena Leggett PA  Historian: Patient, paramedics      HPI:  Chief Complaint: Chest pain, spasms  A complete HPI/ROS/PMH/PSH/SH/FH are limited due to: Patient seems intoxicated  Context: Zuhair Jade Jr. is a 47 y.o. male who presents to the ED c/o new onset chest pain this morning when he woke up.  Patient is unable to give me much history because he seems to be quite tearful and emotional right now.  He also reports some intermittent spasms and \"jerking\" movements of his whole body that have been going on this morning.  He tells me that he has been drinking gin recently.  He denies cigarette smoking and he denies illicit drug use.  He does report some anxiety and stress right now but is not specific regarding what seems to be overwhelming him.        PAST MEDICAL HISTORY  Active Ambulatory Problems     Diagnosis Date Noted   • Health care maintenance 08/30/2018     Resolved Ambulatory Problems     Diagnosis Date Noted   • No Resolved Ambulatory Problems     Past Medical History:   Diagnosis Date   • Diabetes mellitus (HCC)          PAST SURGICAL HISTORY  History reviewed. No pertinent surgical history.      FAMILY HISTORY  Family History   Problem Relation Age of Onset   • Hypertension Father          SOCIAL HISTORY  Social History     Socioeconomic History   • Marital status: Single   Tobacco Use   • Smoking status: Never   • Smokeless tobacco: Never   Substance and Sexual Activity   • Alcohol use: Yes     Comment: Pt says he drinks a pint of gin a day   • Drug use: Never   • Sexual activity: Defer         ALLERGIES  Patient has no known allergies.        REVIEW OF SYSTEMS  Review of Systems   Constitutional: Negative for activity change and fever.   HENT: Negative.    Eyes: Negative for pain and visual disturbance.   Respiratory: Negative for cough and shortness of breath.    Cardiovascular: Positive for chest pain. "   Gastrointestinal: Negative for abdominal pain.   Genitourinary: Negative for dysuria.   Skin: Negative for color change.   Neurological: Negative for syncope and headaches.   All other systems reviewed and are negative.      PHYSICAL EXAM  ED Triage Vitals [02/07/23 0909]   Temp Heart Rate Resp BP SpO2   98.3 °F (36.8 °C) 106 18 138/94 96 %      Temp src Heart Rate Source Patient Position BP Location FiO2 (%)   Oral Monitor -- -- --       Physical Exam      GENERAL: Tearful, emotional, seems intoxicated, no diaphoresis, no acute distress  HENT: nares patent, normocephalic and atraumatic  EYES: no scleral icterus EOMI  CV: regular rhythm, normal rate, no murmurs, normal pulses  RESPIRATORY: normal effort lungs clear bilaterally, no stridor  ABDOMEN: soft nontender in all quadrants  MUSCULOSKELETAL: no deformity, no edema  NEURO: alert, moves all extremities, follows commands  PSYCH:  calm, cooperative  SKIN: warm, dry    Vital signs and nursing notes reviewed.          LAB RESULTS  Recent Results (from the past 24 hour(s))   ECG 12 Lead Chest Pain    Collection Time: 02/07/23  9:14 AM   Result Value Ref Range    QT Interval 333 ms   Comprehensive Metabolic Panel    Collection Time: 02/07/23  9:39 AM    Specimen: Blood   Result Value Ref Range    Glucose 152 (H) 65 - 99 mg/dL    BUN 20 6 - 20 mg/dL    Creatinine 1.15 0.76 - 1.27 mg/dL    Sodium 145 136 - 145 mmol/L    Potassium 3.8 3.5 - 5.2 mmol/L    Chloride 108 (H) 98 - 107 mmol/L    CO2 26.0 22.0 - 29.0 mmol/L    Calcium 8.9 8.6 - 10.5 mg/dL    Total Protein 6.5 6.0 - 8.5 g/dL    Albumin 4.3 3.5 - 5.2 g/dL    ALT (SGPT) 43 (H) 1 - 41 U/L    AST (SGOT) 46 (H) 1 - 40 U/L    Alkaline Phosphatase 117 39 - 117 U/L    Total Bilirubin 0.2 0.0 - 1.2 mg/dL    Globulin 2.2 gm/dL    A/G Ratio 2.0 g/dL    BUN/Creatinine Ratio 17.4 7.0 - 25.0    Anion Gap 11.0 5.0 - 15.0 mmol/L    eGFR 78.0 >60.0 mL/min/1.73   Troponin    Collection Time: 02/07/23  9:39 AM    Specimen: Blood    Result Value Ref Range    HS Troponin T 13 <15 ng/L   Ethanol    Collection Time: 02/07/23  9:39 AM    Specimen: Blood   Result Value Ref Range    Ethanol 336 (H) 0 - 10 mg/dL    Ethanol % 0.336 %   Urine Drug Screen - Urine, Clean Catch    Collection Time: 02/07/23  9:39 AM    Specimen: Urine, Clean Catch   Result Value Ref Range    Amphet/Methamphet, Screen Negative Negative    Barbiturates Screen, Urine Negative Negative    Benzodiazepine Screen, Urine Negative Negative    Cocaine Screen, Urine Negative Negative    Opiate Screen Negative Negative    THC, Screen, Urine Negative Negative    Methadone Screen, Urine Negative Negative    Oxycodone Screen, Urine Negative Negative   CBC Auto Differential    Collection Time: 02/07/23  9:39 AM    Specimen: Blood   Result Value Ref Range    WBC 4.55 3.40 - 10.80 10*3/mm3    RBC 5.23 4.14 - 5.80 10*6/mm3    Hemoglobin 14.5 13.0 - 17.7 g/dL    Hematocrit 44.5 37.5 - 51.0 %    MCV 85.1 79.0 - 97.0 fL    MCH 27.7 26.6 - 33.0 pg    MCHC 32.6 31.5 - 35.7 g/dL    RDW 16.9 (H) 12.3 - 15.4 %    RDW-SD 52.6 37.0 - 54.0 fl    MPV 8.7 6.0 - 12.0 fL    Platelets 317 140 - 450 10*3/mm3   Magnesium    Collection Time: 02/07/23  9:39 AM    Specimen: Blood   Result Value Ref Range    Magnesium 2.4 1.6 - 2.6 mg/dL   Manual Differential    Collection Time: 02/07/23  9:39 AM    Specimen: Blood   Result Value Ref Range    Neutrophil % 42.9 42.7 - 76.0 %    Lymphocyte % 48.0 (H) 19.6 - 45.3 %    Monocyte % 8.2 5.0 - 12.0 %    Basophil % 1.0 0.0 - 1.5 %    Neutrophils Absolute 1.95 1.70 - 7.00 10*3/mm3    Lymphocytes Absolute 2.18 0.70 - 3.10 10*3/mm3    Monocytes Absolute 0.37 0.10 - 0.90 10*3/mm3    Basophils Absolute 0.05 0.00 - 0.20 10*3/mm3    RBC Morphology Normal Normal    Smudge Cells Slight/1+ None Seen    Platelet Morphology Normal Normal   High Sensitivity Troponin T 2Hr    Collection Time: 02/07/23 11:43 AM    Specimen: Blood   Result Value Ref Range    HS Troponin T 12 <15 ng/L     Troponin T Delta -1 <= -/+ 4 Change ng/L       Ordered the above labs and reviewed the results.        RADIOLOGY  XR Chest 1 View    Result Date: 2/7/2023  Portable chest radiograph  HISTORY:Chest pain  TECHNIQUE: Single AP portable radiograph of the chest  COMPARISON:None      FINDINGS AND IMPRESSION: No pulmonary consolidation, pleural effusion or pneumothorax is seen. Cardiac silhouette is at the upper limits of normal to borderline enlarged.  This report was finalized on 2/7/2023 10:21 AM by Dr. Vipin Roque M.D.        Ordered the above noted radiological studies. Reviewed by me in PACS.            PROCEDURES  Procedures    EKG           EKG time/Interp time: 0914/0923  Rhythm/Rate: Sinus rhythm, 103 bpm  P waves and AK: Present, 137 ms  QRS, axis: 96 ms, normal axis, LVH  ST and T waves: No ST segment elevations are present.    Independently interpreted by me contemporaneously with treatment        MEDICATIONS GIVEN IN ER  Medications   sodium chloride 0.9 % flush 10 mL (has no administration in time range)   lactated ringers bolus 1,000 mL (0 mL Intravenous Stopped 2/7/23 1119)   LORazepam (ATIVAN) injection 1 mg (1 mg Intravenous Given 2/7/23 1322)                   MEDICAL DECISION MAKING, PROGRESS, and CONSULTS    All labs have been independently reviewed by me.  All radiology studies have been reviewed by me and I have also reviewed the radiology report.   EKG's independently viewed and interpreted by me.  Discussion below represents my analysis of pertinent findings related to patient's condition, differential diagnosis, treatment plan and final disposition.    Heart score: 2    Additional sources:  - Discussed/ obtained information from independent historians: Discussed with paramedics to receive report from them on arrival.    - External (non-ED) record review: I reviewed previous PCP note from August 2018 when patient had a routine health maintenance evaluation.  There is a remark in that note that  he does have a history of alcoholism and had been sober for 4 years at the time of that visit.    - Chronic or social conditions impacting care: History of EtOH abuse        Orders placed during this visit:  Orders Placed This Encounter   Procedures   • XR Chest 1 View   • Comprehensive Metabolic Panel   • Troponin   • Ethanol   • Urine Drug Screen - Urine, Clean Catch   • CBC Auto Differential   • Magnesium   • Manual Differential   • High Sensitivity Troponin T 2Hr   • Monitor Blood Pressure   • Cardiac Monitoring   • Pulse Oximetry, Continuous   • LHA (on-call MD unless specified) Details   • ECG 12 Lead Chest Pain   • SCANNED - TELEMETRY     • SCANNED - TELEMETRY     • SCANNED - TELEMETRY     • Insert Peripheral IV   • CBC & Differential               Differential diagnosis:    My differential diagnosis for chest pain includes but is not limited to:  Muscle strain, costochondritis, myositis, pleurisy, rib fracture, intercostal neuritis, herpes zoster, tumor, myocardial infarction, coronary syndrome, unstable angina, angina, aortic dissection, mitral valve prolapse, pericarditis, palpitations, pulmonary embolus, pneumonia, pneumothorax, lung cancer, GERD, esophagitis, esophageal spasm        Independent interpretation of labs, radiology studies, and discussions with consultants:  ED Course as of 02/07/23 1344   Tue Feb 07, 2023   0947 I have low clinical suspicion for acute coronary syndrome, PE or dissection based on patient's current presentation.  He seems to be intoxicated and emotional right now.  EKG is reassuring.  We will start the typical cardiac work-up with chest x-ray and troponin testing.  Also checking CMP and giving some IV fluids here. [ALEJANDRO]   1147 Ethanol(!): 336 [ALEJANDRO]   1147 I independently interpreted the chest x-ray and my findings are no pneumothorax, no effusion, no infiltrates. [ALEJANDRO]   1147 Patient resting calmly.  Awaiting second troponin now. [ALEJANDRO]   1259 I just reassessed the patient.  He is  emotional and tearful at this time.  Still having some chest discomfort and body spasms occasionally.  Second troponin is not significantly changed.  I do think the bigger problem right now is his alcohol dependence and risk for alcohol withdrawal symptoms.  Will page hospitalist at this time to request further management of his symptoms [ALEJANDRO]   2232 I discussed with Dr. Mcmahon about this patient and he agrees to accept the patient to the hospitalist team for further medical management of alcohol dependence and withdrawal symptoms in addition to his chest pain complaint. [ALEJANDRO]      ED Course User Index  [ALEJANDRO] Tito Samuel MD             Patient was placed in face mask during triage.  Patient was wearing face mask throughout encounter.  I wore personal protective equipment throughout the encounter.  Hand hygiene was performed before and after patient encounter.     DIAGNOSIS  Final diagnoses:   Chest pain, unspecified type   Alcohol abuse   Alcohol withdrawal syndrome without complication (HCC)         DISPOSITION  Observation to [facility]            Latest Documented Vital Signs:  As of 13:44 EST  BP- 132/84 HR- 92 Temp- 98.3 °F (36.8 °C) (Oral) O2 sat- 100%              --    Please note that portions of this were completed with a voice recognition program.       Note Disclaimer: At [facility], we believe that sharing information builds trust and better relationships. You are receiving this note because you are receiving care at [facility] or recently visited. It is possible you will see health information before a provider has talked with you about it. This kind of information can be easy to misunderstand. To help you fully understand what it means for your health, we urge you to discuss this note with your provider.           Tito Samuel MD  02/07/23

## 2023-02-07 NOTE — PROGRESS NOTES
Clinical Pharmacy Services: Medication History    Zuhair Jade Jr. is a 47 y.o. male presenting to [facility] for   Chief Complaint   Patient presents with   • Chest Pain       He  has a past medical history of Diabetes mellitus (HCC).    Allergies as of 02/07/2023   • (No Known Allergies)       Medication information was obtained from: Patient  Pharmacy and Phone Number:     Prior to Admission Medications     Prescriptions Last Dose Informant Patient Reported? Taking?    amLODIPine-atorvastatin (CADUET) 10-10 MG per tablet 2/7/2023 Self Yes Yes    Take 1 tablet by mouth Daily.    omeprazole (priLOSEC) 20 MG capsule 2/7/2023 Self Yes Yes    Take 1 capsule by mouth Daily.    metFORMIN (GLUCOPHAGE) 500 MG tablet More than a month Self Yes No    Take 1 tablet by mouth Every 12 (Twelve) Hours.            Medication notes: Pt stated he takes Sildenafil but has not taking for about a month.    This medication list is complete to the best of my knowledge as of 2/7/2023    Please call if questions.    Yolanda Lockwood  Medication History Technician   [phone]    2/7/2023 13:40 EST

## 2023-02-08 ENCOUNTER — APPOINTMENT (OUTPATIENT)
Dept: CT IMAGING | Facility: HOSPITAL | Age: 48
End: 2023-02-08
Payer: MEDICAID

## 2023-02-08 PROBLEM — R45.851 SUICIDAL IDEATIONS: Status: ACTIVE | Noted: 2023-02-08

## 2023-02-08 LAB
ALBUMIN SERPL-MCNC: 4 G/DL (ref 3.5–5.2)
ALBUMIN/GLOB SERPL: 1.7 G/DL
ALP SERPL-CCNC: 110 U/L (ref 39–117)
ALT SERPL W P-5'-P-CCNC: 69 U/L (ref 1–41)
AMYLASE SERPL-CCNC: 49 U/L (ref 28–100)
ANION GAP SERPL CALCULATED.3IONS-SCNC: 11 MMOL/L (ref 5–15)
AST SERPL-CCNC: 97 U/L (ref 1–40)
BILIRUB SERPL-MCNC: 0.3 MG/DL (ref 0–1.2)
BUN SERPL-MCNC: 16 MG/DL (ref 6–20)
BUN/CREAT SERPL: 14.8 (ref 7–25)
CALCIUM SPEC-SCNC: 8.3 MG/DL (ref 8.6–10.5)
CHLORIDE SERPL-SCNC: 101 MMOL/L (ref 98–107)
CO2 SERPL-SCNC: 25 MMOL/L (ref 22–29)
CREAT SERPL-MCNC: 1.08 MG/DL (ref 0.76–1.27)
DEPRECATED RDW RBC AUTO: 49.2 FL (ref 37–54)
EGFRCR SERPLBLD CKD-EPI 2021: 84.1 ML/MIN/1.73
ERYTHROCYTE [DISTWIDTH] IN BLOOD BY AUTOMATED COUNT: 16.1 % (ref 12.3–15.4)
GLOBULIN UR ELPH-MCNC: 2.3 GM/DL
GLUCOSE BLDC GLUCOMTR-MCNC: 116 MG/DL (ref 70–130)
GLUCOSE BLDC GLUCOMTR-MCNC: 118 MG/DL (ref 70–130)
GLUCOSE BLDC GLUCOMTR-MCNC: 119 MG/DL (ref 70–130)
GLUCOSE SERPL-MCNC: 100 MG/DL (ref 65–99)
HCT VFR BLD AUTO: 41.5 % (ref 37.5–51)
HGB BLD-MCNC: 13.6 G/DL (ref 13–17.7)
LIPASE SERPL-CCNC: 19 U/L (ref 13–60)
MCH RBC QN AUTO: 27.5 PG (ref 26.6–33)
MCHC RBC AUTO-ENTMCNC: 32.8 G/DL (ref 31.5–35.7)
MCV RBC AUTO: 83.8 FL (ref 79–97)
PLATELET # BLD AUTO: 287 10*3/MM3 (ref 140–450)
PMV BLD AUTO: 8.6 FL (ref 6–12)
POTASSIUM SERPL-SCNC: 4.1 MMOL/L (ref 3.5–5.2)
PROT SERPL-MCNC: 6.3 G/DL (ref 6–8.5)
RBC # BLD AUTO: 4.95 10*6/MM3 (ref 4.14–5.8)
SODIUM SERPL-SCNC: 137 MMOL/L (ref 136–145)
TSH SERPL DL<=0.05 MIU/L-ACNC: 1.51 UIU/ML (ref 0.27–4.2)
WBC NRBC COR # BLD: 3.58 10*3/MM3 (ref 3.4–10.8)

## 2023-02-08 PROCEDURE — G0378 HOSPITAL OBSERVATION PER HR: HCPCS

## 2023-02-08 PROCEDURE — 99214 OFFICE O/P EST MOD 30 MIN: CPT | Performed by: INTERNAL MEDICINE

## 2023-02-08 PROCEDURE — 82962 GLUCOSE BLOOD TEST: CPT

## 2023-02-08 PROCEDURE — 25010000002 IOPAMIDOL 61 % SOLUTION: Performed by: INTERNAL MEDICINE

## 2023-02-08 PROCEDURE — 74177 CT ABD & PELVIS W/CONTRAST: CPT

## 2023-02-08 PROCEDURE — 82150 ASSAY OF AMYLASE: CPT | Performed by: INTERNAL MEDICINE

## 2023-02-08 PROCEDURE — 83690 ASSAY OF LIPASE: CPT | Performed by: INTERNAL MEDICINE

## 2023-02-08 PROCEDURE — 25010000002 THIAMINE PER 100 MG: Performed by: INTERNAL MEDICINE

## 2023-02-08 PROCEDURE — 80050 GENERAL HEALTH PANEL: CPT | Performed by: INTERNAL MEDICINE

## 2023-02-08 PROCEDURE — 90791 PSYCH DIAGNOSTIC EVALUATION: CPT

## 2023-02-08 PROCEDURE — 0 DIATRIZOATE MEGLUMINE & SODIUM PER 1 ML: Performed by: INTERNAL MEDICINE

## 2023-02-08 RX ORDER — ESCITALOPRAM OXALATE 10 MG/1
10 TABLET ORAL NIGHTLY
Status: DISCONTINUED | OUTPATIENT
Start: 2023-02-08 | End: 2023-02-09 | Stop reason: HOSPADM

## 2023-02-08 RX ORDER — LABETALOL HYDROCHLORIDE 5 MG/ML
10 INJECTION, SOLUTION INTRAVENOUS
Status: DISCONTINUED | OUTPATIENT
Start: 2023-02-08 | End: 2023-02-09 | Stop reason: HOSPADM

## 2023-02-08 RX ADMIN — ATORVASTATIN CALCIUM 10 MG: 20 TABLET, FILM COATED ORAL at 08:06

## 2023-02-08 RX ADMIN — DIATRIZOATE MEGLUMINE AND DIATRIZOATE SODIUM 30 ML: 660; 100 LIQUID ORAL; RECTAL at 08:51

## 2023-02-08 RX ADMIN — SUCRALFATE 1 G: 1 TABLET ORAL at 11:01

## 2023-02-08 RX ADMIN — THIAMINE HYDROCHLORIDE 300 MG: 100 INJECTION, SOLUTION INTRAMUSCULAR; INTRAVENOUS at 13:35

## 2023-02-08 RX ADMIN — Medication 3 ML: at 08:10

## 2023-02-08 RX ADMIN — THIAMINE HYDROCHLORIDE 300 MG: 100 INJECTION, SOLUTION INTRAMUSCULAR; INTRAVENOUS at 20:57

## 2023-02-08 RX ADMIN — SUCRALFATE 1 G: 1 TABLET ORAL at 08:06

## 2023-02-08 RX ADMIN — FOLIC ACID 1 MG: 1 TABLET ORAL at 08:06

## 2023-02-08 RX ADMIN — LORAZEPAM 1 MG: 1 TABLET ORAL at 13:34

## 2023-02-08 RX ADMIN — SUCRALFATE 1 G: 1 TABLET ORAL at 16:32

## 2023-02-08 RX ADMIN — PANTOPRAZOLE SODIUM 40 MG: 40 TABLET, DELAYED RELEASE ORAL at 16:33

## 2023-02-08 RX ADMIN — Medication 3 ML: at 20:57

## 2023-02-08 RX ADMIN — THIAMINE HYDROCHLORIDE 300 MG: 100 INJECTION, SOLUTION INTRAMUSCULAR; INTRAVENOUS at 05:02

## 2023-02-08 RX ADMIN — ESCITALOPRAM 10 MG: 10 TABLET, FILM COATED ORAL at 20:57

## 2023-02-08 RX ADMIN — SUCRALFATE 1 G: 1 TABLET ORAL at 20:57

## 2023-02-08 RX ADMIN — Medication 1 TABLET: at 08:06

## 2023-02-08 RX ADMIN — LORAZEPAM 1 MG: 1 TABLET ORAL at 01:55

## 2023-02-08 RX ADMIN — IOPAMIDOL 85 ML: 612 INJECTION, SOLUTION INTRAVENOUS at 10:40

## 2023-02-08 RX ADMIN — LORAZEPAM 1 MG: 1 TABLET ORAL at 08:05

## 2023-02-08 RX ADMIN — AMLODIPINE BESYLATE 5 MG: 5 TABLET ORAL at 08:06

## 2023-02-08 RX ADMIN — PANTOPRAZOLE SODIUM 40 MG: 40 TABLET, DELAYED RELEASE ORAL at 08:05

## 2023-02-08 RX ADMIN — LORAZEPAM 1 MG: 1 TABLET ORAL at 20:57

## 2023-02-08 NOTE — PLAN OF CARE
Problem: Adult Inpatient Plan of Care  Goal: Plan of Care Review  Outcome: Ongoing, Progressing  Goal: Patient-Specific Goal (Individualized)  Outcome: Ongoing, Progressing  Goal: Absence of Hospital-Acquired Illness or Injury  Outcome: Ongoing, Progressing  Intervention: Identify and Manage Fall Risk  Recent Flowsheet Documentation  Taken 2/8/2023 0548 by Zina Gage RN  Safety Promotion/Fall Prevention:   activity supervised   clutter free environment maintained   fall prevention program maintained   safety round/check completed  Taken 2/8/2023 0450 by Zina Gage, RN  Safety Promotion/Fall Prevention:   activity supervised   clutter free environment maintained   fall prevention program maintained   safety round/check completed  Taken 2/8/2023 0200 by Zina Gage RN  Safety Promotion/Fall Prevention:   activity supervised   clutter free environment maintained   fall prevention program maintained   safety round/check completed  Taken 2/8/2023 0040 by Zina Gage RN  Safety Promotion/Fall Prevention:   activity supervised   clutter free environment maintained   fall prevention program maintained   safety round/check completed  Taken 2/8/2023 0000 by Zina Gage RN  Safety Promotion/Fall Prevention:   activity supervised   clutter free environment maintained   fall prevention program maintained   safety round/check completed  Taken 2/7/2023 2200 by Zina Gage RN  Safety Promotion/Fall Prevention:   activity supervised   clutter free environment maintained   fall prevention program maintained   safety round/check completed  Taken 2/7/2023 2000 by Zina Gage RN  Safety Promotion/Fall Prevention:   activity supervised   clutter free environment maintained   fall prevention program maintained   room organization consistent   safety round/check completed  Intervention: Prevent Skin Injury  Recent Flowsheet Documentation  Taken 2/8/2023 0450 by Zina Gage RN  Body Position: position changed  independently  Taken 2/8/2023 0000 by Zina Gage RN  Body Position: position changed independently  Taken 2/7/2023 2000 by Zina Gage RN  Body Position: position changed independently  Intervention: Prevent and Manage VTE (Venous Thromboembolism) Risk  Recent Flowsheet Documentation  Taken 2/8/2023 0000 by Zina Gage RN  Activity Management: activity adjusted per tolerance  Taken 2/7/2023 2000 by Zina Gage RN  Activity Management: activity adjusted per tolerance  Intervention: Prevent Infection  Recent Flowsheet Documentation  Taken 2/8/2023 0548 by Zina Gage RN  Infection Prevention: rest/sleep promoted  Taken 2/8/2023 0450 by Zina Gage RN  Infection Prevention: rest/sleep promoted  Taken 2/8/2023 0200 by Zina Gage RN  Infection Prevention: rest/sleep promoted  Taken 2/8/2023 0040 by Zina Gage RN  Infection Prevention: rest/sleep promoted  Taken 2/8/2023 0000 by Zina Gage RN  Infection Prevention: personal protective equipment utilized  Taken 2/7/2023 2200 by Zina Gage RN  Infection Prevention: personal protective equipment utilized  Taken 2/7/2023 2000 by Zina Gage RN  Infection Prevention: personal protective equipment utilized  Goal: Optimal Comfort and Wellbeing  Outcome: Ongoing, Progressing  Goal: Readiness for Transition of Care  Outcome: Ongoing, Progressing     Problem: Suicide Risk  Goal: Absence of Self-Harm  Outcome: Ongoing, Progressing  Intervention: Assess Risk to Self and Maintain Safety  Recent Flowsheet Documentation  Taken 2/8/2023 0450 by Zina Gage RN  Self-Harm Prevention: observed one-to-one  Taken 2/8/2023 0040 by Zina Gage RN  Self-Harm Prevention: observed one-to-one  Taken 2/7/2023 2200 by Zina Gage RN  Self-Harm Prevention: observed one-to-one  Taken 2/7/2023 2000 by Zina Gage RN  Self-Harm Prevention: observed one-to-one   Goal Outcome Evaluation:      VSS, RA, A&Ox4. Patient from home c/o chest pain and ETOH  withdrawal. CP resolved and workup neg. CIWA 3 with scheduled ativan helping. Pt states drinks a pint or more of gin a day. GI consulted. Hansel @bedside d/t patient proclaiming suicidal thoughts and thought on how to carry out. Pt told this RN he will not hurt himself while in hospital. Attributes drinking to father passing away

## 2023-02-08 NOTE — PLAN OF CARE
Problem: Adult Inpatient Plan of Care  Goal: Plan of Care Review  Outcome: Ongoing, Progressing  Goal: Patient-Specific Goal (Individualized)  Outcome: Ongoing, Progressing  Goal: Absence of Hospital-Acquired Illness or Injury  Outcome: Ongoing, Progressing  Goal: Optimal Comfort and Wellbeing  Outcome: Ongoing, Progressing  Goal: Readiness for Transition of Care  Outcome: Ongoing, Progressing     Problem: Suicide Risk  Goal: Absence of Self-Harm  Outcome: Ongoing, Progressing   Goal Outcome Evaluation:

## 2023-02-08 NOTE — CONSULTS
IDENTIFYING INFORMATION: The patient is a 47-year-old -American male admitted related to alcohol abuse.    CHIEF COMPLAINT: None given    INFORMANT: Patient and chart    RELIABILITY: Good    HISTORY OF PRESENT ILLNESS: The patient is a 47-year-old -American male admitted related to increasing alcohol use.  The patient reports that he has been treated on multiple occasions in the past while living in [place] including treatments at the [facility], [facility] and alcoholics [facility].  He has a sponsor and several friends in recovery.  The patient is seen related to some depression and possible suicidal ideations though he vehemently denies suicidal thinking.  He denies prior suicide attempts or gestures.  He does complain of depressed mood and states that he has interest in beginning treatment for depression.  He is also expressing interest in a return to residential chemical dependence treatment once medically stable.    PAST PSYCHIATRIC HISTORY: As above    PAST MEDICAL HISTORY: Patient does have a history of DTs and tremors in the past.  He is diabetic    MEDICATIONS:   Current Facility-Administered Medications   Medication Dose Route Frequency Provider Last Rate Last Admin   • amLODIPine (NORVASC) tablet 5 mg  5 mg Oral Q24H Bubba Mcmahon MD   5 mg at 02/08/23 0806   • atorvastatin (LIPITOR) tablet 10 mg  10 mg Oral Daily Bubba Mcmahon MD   10 mg at 02/08/23 0806   • dextrose (D50W) (25 g/50 mL) IV injection 25 g  25 g Intravenous Q15 Min PRN Bubba Mcmahon MD       • dextrose (GLUTOSE) oral gel 15 g  15 g Oral Q15 Min PRN Bubba Mcmahon MD       • docusate sodium (COLACE) capsule 100 mg  100 mg Oral BID PRN Bubba Mcmahon MD       • famotidine (PEPCID) tablet 20 mg  20 mg Oral BID PRN Bubba Mcmahon MD       • [START ON 2/11/2023] thiamine (VITAMIN B-1) tablet 250 mg  250 mg Oral Daily Bubba Mcmahon MD        And   •  multivitamin (THERAGRAN) tablet 1 tablet  1 tablet Oral Daily Bubba Mcmahon MD   1 tablet at 02/08/23 0806    And   • folic acid (FOLVITE) tablet 1 mg  1 mg Oral Daily Bubba Mcmahon MD   1 mg at 02/08/23 0806   • glucagon (GLUCAGEN) injection 1 mg  1 mg Intramuscular Q15 Min PRN Bubba Mcmahon MD       • influenza vac split quad (FLUZONE,FLUARIX,AFLURIA,FLULAVAL) injection 0.5 mL  0.5 mL Intramuscular During Hospitalization Bubba Mcmahon MD       • insulin lispro (ADMELOG) injection 0-9 Units  0-9 Units Subcutaneous TID AC Bubba Mcmahon MD       • LORazepam (ATIVAN) tablet 1 mg  1 mg Oral Q2H PRN Bubba Mcmahon MD        Or   • LORazepam (ATIVAN) injection 1 mg  1 mg Intravenous Q2H PRN Bubba Mcmahon MD        Or   • LORazepam (ATIVAN) tablet 2 mg  2 mg Oral Q1H PRN Bubba Mcmahon MD        Or   • LORazepam (ATIVAN) injection 2 mg  2 mg Intravenous Q1H PRN Bubba Mcmahon MD        Or   • LORazepam (ATIVAN) injection 2 mg  2 mg Intravenous Q15 Min PRN Bubba Mcmahon MD        Or   • LORazepam (ATIVAN) injection 2 mg  2 mg Intramuscular Q15 Min PRN Bubba Mcmahon MD       • LORazepam (ATIVAN) tablet 1 mg  1 mg Oral Q8H Bubba Mcmahon MD       • melatonin tablet 3 mg  3 mg Oral Nightly PRN Bubba Mcmahon MD       • ondansetron (ZOFRAN) tablet 4 mg  4 mg Oral Q6H PRN Bubba Mcmahon MD        Or   • ondansetron (ZOFRAN) injection 4 mg  4 mg Intravenous Q6H PRN Bubba Mcmahon MD       • pantoprazole (PROTONIX) EC tablet 40 mg  40 mg Oral BID AC Bubba Mcmahon MD   40 mg at 02/08/23 0805   • sodium chloride 0.9 % flush 10 mL  10 mL Intravenous PRN Tito Samuel MD       • sodium chloride 0.9 % flush 3 mL  3 mL Intravenous Q12H Bubba Mcmahon MD   3 mL at 02/08/23 0810   • sodium chloride 0.9 % flush 3-10 mL  3-10 mL Intravenous PRN Bubba Mcmahon  "MD Frankie       • sodium chloride 0.9 % infusion 40 mL  40 mL Intravenous PRN Bubba Mcmahon MD       • sucralfate (CARAFATE) tablet 1 g  1 g Oral 4x Daily AC & at Bedtime Colby Aguila MD   1 g at 02/08/23 1101   • thiamine (B-1) 300 mg in sodium chloride 0.9 % 100 mL IVPB  300 mg Intravenous Q8H Bubba Mcmahon  mL/hr at 02/08/23 0502 300 mg at 02/08/23 0502   • traMADol (ULTRAM) tablet 25 mg  25 mg Oral Q6H PRN Bubba Mcmahon MD             ALLERGIES: None    FAMILY HISTORY: Noncontributory    SOCIAL HISTORY: The patient is currently working at \"[employer]\" 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  His alcohol use is as noted previously.  He denies use or abuse of substances other than alcohol    MENTAL STATUS EXAM: Patient is a well-developed well-nourished -American male appearing stated age.  He is in no apparent physical distress at the time of examination.  He is awake alert and oriented in all spheres.  His mood is euthymic his affect congruent.  Speech is well coherent.  There are no deficits memory or cognition noted.  Intelligence is judged to be in the average range based on fund of knowledge, the patient is cooperative throughout the interview.  He is currently denying suicidal or homicidal ideation or psychotic features and his judgment and insight are intact    ASSETS/LIABILITIES: Motivation for change    DIAGNOSTIC IMPRESSION: Alcohol use disorder, depressive disorder unspecified, diabetes mellitus    PLAN: The patient is currently expressing interest in returning to residential chemical dependence treatment and I am fully in agreement with this plan.  Additionally he is agreeable to initiation of antidepressant medication, and I will start him on Lexapro 10 mg daily to address depressive symptoms.  At this time he is able to promise safety in the hospital and I will discontinue suicide precautions and sitter.  "

## 2023-02-08 NOTE — CONSULTS
Spiritual Care     Pt has a sponsor, is open to 12 step spirituality, pt able to process losses of father and SO which became triggers for relapse.    Pt open to anti-depressant meds and devotional literature to support his spiritual journey.    Pt seems to have a vital sense of spiritual aliveness, a natural compassion and kindness,no presence of toxic shame, and  no evidence of character pathology in 40 minute interview.      Dr. Aguirre joined the session and explored treatment issues with pt.      Closed with contemplative, meditative prayer.,     Tomas Chacon

## 2023-02-08 NOTE — PROGRESS NOTES
[facility]  Inpatient Progress Note    Reason for Followup:  Epigastric pain    Subjective:  Patient reports epigastric burning pain with swallowing.  He denies dysphagia.       Current Facility-Administered Medications:   •  amLODIPine (NORVASC) tablet 5 mg, 5 mg, Oral, Q24H, Bubba Mcmahon MD, 5 mg at 02/08/23 0806  •  atorvastatin (LIPITOR) tablet 10 mg, 10 mg, Oral, Daily, Bubba Mcmahon MD, 10 mg at 02/08/23 0806  •  dextrose (D50W) (25 g/50 mL) IV injection 25 g, 25 g, Intravenous, Q15 Min PRN, Bubba Mcmahon MD  •  dextrose (GLUTOSE) oral gel 15 g, 15 g, Oral, Q15 Min PRN, Bubba Mcmahon MD  •  diatrizoate meglumine-sodium (GASTROGRAFIN) 66-10 % oral solution 30 mL, 30 mL, Oral, Once, Bubba Mcmahon MD  •  docusate sodium (COLACE) capsule 100 mg, 100 mg, Oral, BID PRN, Bubba Mcmahon MD  •  famotidine (PEPCID) tablet 20 mg, 20 mg, Oral, BID PRN, Bubba Mcmahon MD  •  [START ON 2/11/2023] thiamine (VITAMIN B-1) tablet 250 mg, 250 mg, Oral, Daily **AND** multivitamin (THERAGRAN) tablet 1 tablet, 1 tablet, Oral, Daily, 1 tablet at 02/08/23 0806 **AND** folic acid (FOLVITE) tablet 1 mg, 1 mg, Oral, Daily, Bubba Mcmahon MD, 1 mg at 02/08/23 0806  •  glucagon (GLUCAGEN) injection 1 mg, 1 mg, Intramuscular, Q15 Min PRN, Bubba Mcmahon MD  •  influenza vac split quad (FLUZONE,FLUARIX,AFLURIA,FLULAVAL) injection 0.5 mL, 0.5 mL, Intramuscular, During Hospitalization, Bubba Mcmahon MD  •  insulin lispro (ADMELOG) injection 0-9 Units, 0-9 Units, Subcutaneous, TID AC, Bubba Mcmahon MD  •  LORazepam (ATIVAN) tablet 1 mg, 1 mg, Oral, Q2H PRN **OR** LORazepam (ATIVAN) injection 1 mg, 1 mg, Intravenous, Q2H PRN **OR** LORazepam (ATIVAN) tablet 2 mg, 2 mg, Oral, Q1H PRN **OR** LORazepam (ATIVAN) injection 2 mg, 2 mg, Intravenous, Q1H PRN **OR** LORazepam (ATIVAN) injection 2 mg, 2 mg,  Intravenous, Q15 Min PRN **OR** LORazepam (ATIVAN) injection 2 mg, 2 mg, Intramuscular, Q15 Min PRN, Bubba Mcmahon MD  •  [COMPLETED] LORazepam (ATIVAN) tablet 1 mg, 1 mg, Oral, Q6H, 1 mg at 02/08/23 0805 **FOLLOWED BY** LORazepam (ATIVAN) tablet 1 mg, 1 mg, Oral, Q8H, Bubba Mcmahon MD  •  melatonin tablet 3 mg, 3 mg, Oral, Nightly PRN, Bubba Mcmahon MD  •  ondansetron (ZOFRAN) tablet 4 mg, 4 mg, Oral, Q6H PRN **OR** ondansetron (ZOFRAN) injection 4 mg, 4 mg, Intravenous, Q6H PRN, Bubba Mcmahon MD  •  pantoprazole (PROTONIX) EC tablet 40 mg, 40 mg, Oral, BID AC, Bubba Mcmahon MD, 40 mg at 02/08/23 0805  •  [COMPLETED] Insert Peripheral IV, , , Once **AND** sodium chloride 0.9 % flush 10 mL, 10 mL, Intravenous, PRN, Tito Samuel MD  •  sodium chloride 0.9 % flush 3 mL, 3 mL, Intravenous, Q12H, Bubba Mcmahon MD, 3 mL at 02/08/23 0810  •  sodium chloride 0.9 % flush 3-10 mL, 3-10 mL, Intravenous, PRN, Bubba Mcmahon MD  •  sodium chloride 0.9 % infusion 40 mL, 40 mL, Intravenous, PRN, Bubba Mcmahon MD  •  sucralfate (CARAFATE) tablet 1 g, 1 g, Oral, 4x Daily AC & at Bedtime, Colby Aguila MD, 1 g at 02/08/23 0806  •  thiamine (B-1) 300 mg in sodium chloride 0.9 % 100 mL IVPB, 300 mg, Intravenous, Q8H, Bubba Mcmahon MD, Last Rate: 200 mL/hr at 02/08/23 0502, 300 mg at 02/08/23 0502  •  traMADol (ULTRAM) tablet 25 mg, 25 mg, Oral, Q6H PRN, Bubba Mcmahon MD  Review of Systems:   Gen: No fever or chills  GI: No abd pain, nausea, vomiting      Objective     Vital Signs  Temp:  [97.7 °F (36.5 °C)-99.5 °F (37.5 °C)] 99.5 °F (37.5 °C)  Heart Rate:  [] 105  Resp:  [18] 18  BP: (121-152)/(83-99) 139/94  Body mass index is 32.93 kg/m².  No intake or output data in the 24 hours ending 02/08/23 0826  No intake/output data recorded.     Physical Exam:   General: patient awake, alert and cooperative   Eyes: Normal  lids and lashes, no scleral icterus   Cardiovascular: tachycardia, well-perfused extremities   Pulm: Equal expansion bilaterally, no increased WOB   Abdomen: soft, nontender, nondistended;               Neuro: A&O, no obvious sign of focal deficit   Psychiatric: Normal mood and behavior       Results Review:     I reviewed the patient's new clinical results.    Results from last 7 days   Lab Units 02/08/23  0619 02/07/23  0939   WBC 10*3/mm3 3.58 4.55   HEMOGLOBIN g/dL 13.6 14.5   HEMATOCRIT % 41.5 44.5   PLATELETS 10*3/mm3 287 317     Results from last 7 days   Lab Units 02/08/23  0619 02/07/23  0939   SODIUM mmol/L 137 145   POTASSIUM mmol/L 4.1 3.8   CHLORIDE mmol/L 101 108*   CO2 mmol/L 25.0 26.0   BUN mg/dL 16 20   CREATININE mg/dL 1.08 1.15   CALCIUM mg/dL 8.3* 8.9   BILIRUBIN mg/dL 0.3 0.2   ALK PHOS U/L 110 117   ALT (SGPT) U/L 69* 43*   AST (SGOT) U/L 97* 46*   GLUCOSE mg/dL 100* 152*         Lab Results   Lab Value Date/Time    LIPASE 19 02/08/2023 0619       Radiology:  [unfilled]      Assessment & Plan   Assessment:   1. Alcoholism with acute intoxication  2. Alcohol withdrawal  3. Elevated LFT's  4. Epigastric Burning Pain, reports worse after swallowing  5. H/o GERD, denies dysphagia today  6. Weight loss  7. FH (father) colon cancer. The patient has never had a colonoscopy.      Plan:   - hepatitis panel negative, normal lipase  - CT abd/pelvis without acute GI findings, work up of mentioned adrenal nodule per primary team and/or PCP  - thiamine/folate replacement and withdrawal monitoring per primary team  - Continue ppi and carafate  -  He should have an outpatient EGD and colonoscopy       I discussed the patient's findings and my recommendations with patient.

## 2023-02-08 NOTE — PROGRESS NOTES
[facility]  PROGRESS NOTE    Patient Name: Zuhair Jade Jr.  : 1975  MRN: [id number]    Date of Admission: 2023  Primary Care Physician: Nena Leggett PA    Subjective   Subjective     CC:  Follow-up alcohol withdrawal    Subjective:  Patient reports feeling somewhat depressed.  He does feel he is going through withdrawal currently.  He notes some tremors.  He says he is motivated for sobriety.  No current chest pain.    Review of Systems  No current fevers or chills  No current shortness of breath or cough  No current chest pain or palpitations      Objective   Objective     Vital Signs:   Temp:  [97.7 °F (36.5 °C)-99.5 °F (37.5 °C)] 98.8 °F (37.1 °C)  Heart Rate:  [] 95  Resp:  [17-18] 17  BP: (134-157)/() 157/103        Physical Exam:  Constitutional:Awake, alert, nontoxic  HENT: NCAT, mucous membranes moist, neck supple  Respiratory: No cough or wheezing, respiratory effort normal, nonlabored breathing   Cardiovascular: Rate is borderline tachycardic, normal radial pulses  Gastrointestinal: Positive bowel sounds, soft, nontender, nondistended  Musculoskeletal: Obese BMI is 32, no lower extremity edema  Psychiatric: Anxious/depressed affect, cooperative, conversational  Neurologic: No slurred speech or facial droop, follows commands  Skin: No rashes or jaundice, warm      Results Reviewed:  Results from last 7 days   Lab Units 23  0619 23  0939   WBC 10*3/mm3 3.58 4.55   HEMOGLOBIN g/dL 13.6 14.5   HEMATOCRIT % 41.5 44.5   PLATELETS 10*3/mm3 287 317     Results from last 7 days   Lab Units 23  0619 23  1935 23  1143 23  0939   SODIUM mmol/L 137  --   --  145   POTASSIUM mmol/L 4.1  --   --  3.8   CHLORIDE mmol/L 101  --   --  108*   CO2 mmol/L 25.0  --   --  26.0   BUN mg/dL 16  --   --  20   CREATININE mg/dL 1.08  --   --  1.15   GLUCOSE mg/dL 100*  --   --  152*   CALCIUM mg/dL 8.3*  --   --  8.9   ALT (SGPT) U/L 69*  --   --   43*   AST (SGOT) U/L 97*  --   --  46*   HSTROP T ng/L  --  8 12 13     Estimated Creatinine Clearance: 88.1 mL/min (by C-G formula based on SCr of 1.08 mg/dL).    Microbiology Results Abnormal     None          Imaging Results (Last 24 Hours)     Procedure Component Value Units Date/Time    CT Abdomen Pelvis With Contrast [[id number]] Collected: 02/08/23 1102     Updated: 02/08/23 1148    Narrative:      Exam: CT of the abdomen and pelvis with contrast.     HISTORY: Weight loss, heavy drinker, concern for ulcer or esophagitis,  elevated LFTs.     TECHNIQUE: Radiation dose reduction techniques were utilized, including  automated exposure control and exposure modulation based on body size.   3 mm images were obtained through the abdomen and pelvis with IV  contrast. Portal venous phase imaging performed through the abdomen and  nephrographic phase imaging through the abdomen and pelvis. Image  quality is somewhat degraded by overlying monitors/motion artifact. The  upper abdomen is not fully included in the field-of-view on the initial  phase.     COMPARISON: None     FINDINGS:  Lower chest: Dependent bibasilar atelectasis and/or scarring. The heart  size is within normal limits..     Liver: The liver measures top normal. Lobular contour with slight  widening of the fissures suggesting chronic liver disease. Diffuse  low-attenuation suggesting hepatic steatosis. Recommend correlation with  laboratory values and continued surveillance as clinically indicated.     Biliary tract: Within normal limits.     Spleen: Within normal limits.     Pancreas: Within normal limits.     Adrenals: Indeterminant 2.1 cm left adrenal nodule. Thickening right  adrenal gland.     Kidneys:  Within normal limits.     Bowel:  No obstruction. Colonic diverticulosis. Correlation with  endoscopy could be considered for further evaluation as indicated.  Normal appendix.     Peritoneum: Within normal limits.     Vasculature:    Within normal  limits.     Lymph Nodes:  Within normal limits.                                                            Pelvic organs: Within normal limits.     Abdominal/Pelvic Wall: Within normal limits.     Bones: Degenerative changes thoracolumbar spine and pelvis. Nonspecific  sclerotic foci in the pelvis favoring bone islands.       Impression:      1.  No acute intra-abdominal or pelvic process on this somewhat motion  limited exam.  2.  Morphologic changes of chronic liver disease with hepatic steatosis.  3.  Indeterminate 2.1 cm left adrenal nodule favoring an adenoma but can  better be characterized with MRI if there are no priors available for  comparison.  4.  Colonic diverticulosis.  5.  Please see above for additional findings/recommendations.            This report was finalized on 2/8/2023 11:44 AM by Dr. Matilda Marshall M.D.                 I have reviewed the medications:  Scheduled Meds:amLODIPine, 5 mg, Oral, Q24H  atorvastatin, 10 mg, Oral, Daily  escitalopram, 10 mg, Oral, Nightly  [START ON 2/11/2023] thiamine, 250 mg, Oral, Daily   And  multivitamin, 1 tablet, Oral, Daily   And  folic acid, 1 mg, Oral, Daily  insulin lispro, 0-9 Units, Subcutaneous, TID AC  LORazepam, 1 mg, Oral, Q8H  pantoprazole, 40 mg, Oral, BID AC  sodium chloride, 3 mL, Intravenous, Q12H  sucralfate, 1 g, Oral, 4x Daily AC & at Bedtime  thiamine (VITAMIN B1) IVPB, 300 mg, Intravenous, Q8H      Continuous Infusions:   PRN Meds:.•  dextrose  •  dextrose  •  docusate sodium  •  famotidine  •  glucagon (human recombinant)  •  influenza vaccine  •  LORazepam **OR** LORazepam **OR** LORazepam **OR** LORazepam **OR** LORazepam **OR** LORazepam  •  melatonin  •  ondansetron **OR** ondansetron  •  [COMPLETED] Insert Peripheral IV **AND** sodium chloride  •  sodium chloride  •  sodium chloride  •  traMADol    Assessment & Plan   Assessment & Plan     Active Hospital Problems    Diagnosis  POA   • **Alcohol withdrawal (HCC) [F10.939]  Yes   •  Suicidal ideations [R45.851]  Not Applicable   • Esophageal pain [K22.89]  Yes   • Chest pain, atypical [R07.89]  Yes   • Alcohol dependence (HCC) [F10.20]  Yes   • Diabetes mellitus (HCC) [E11.9]  Yes   • GERD (gastroesophageal reflux disease) [K21.9]  Yes   • HTN (hypertension) [I10]  Yes   • Obese [E66.9]  Yes      Resolved Hospital Problems   No resolved problems to display.        Brief Hospital Course to date:  Zuhair Jade Jr. is a 47 y.o. male presents to the hospital with alcohol dependence and early alcohol withdrawal with atypical chest/esophageal pain.     Discussion/plan for today: Patient reported suicidal ideation last night.  Consult psychiatry to evaluate.  GI consult team recommendations appreciated.  Carafate has been added per the recommendation.  Continue PPI twice daily.  Abdominal pain seems to be improved.  HIV and hepatitis are negative.  Continue management per withdrawal protocol.  Access team providing resources.    Alcohol dependence with withdrawal:  Plan to admit and monitor for withdrawal protocol.  We will give him low-dose scheduled Ativan and as needed Ativan for breakthrough.  Give vitamins.  Patient had some recent confusion I will put him on therapeutic thiamine as he reports poor recent oral intake.  Consult access for resources for sobriety.  Patient claims he is motivated to quit.     Atypical chest pain/esophagitis:  Seems more gastrointestinal per history.  It is not worsened with exertion.  EKG reviewed and shows sinus rhythm without ST elevation or depression.  Troponin negative.  Strong concern for esophagitis or ulcer related to drinking.    Gastroenterology team recommend twice daily PPI and Carafate.  Recommend outpatient evaluation for EGD.  Symptoms seem to be improving.     Diabetes: Monitor glucose and adjust insulin as needed.  Hold metformin at patient noted recent diarrhea.     GERD: Increase PPI to twice daily.     Hypertension: Decrease amlodipine to 5 mg  while acutely ill and monitor blood pressure and adjust as needed.     Obesity weight loss recommended.  BMI is 33.     Hyperlipidemia: Continue statin.     DVT prophylaxis: Low Padua score       Disposition: Home with alcohol rehab pending improvement and pending psychiatric clearance    CODE STATUS:   Code Status and Medical Interventions:   Ordered at: 02/07/23 1402     Level Of Support Discussed With:    Patient     Code Status (Patient has no pulse and is not breathing):    CPR (Attempt to Resuscitate)     Medical Interventions (Patient has pulse or is breathing):    Full Support       Bubba Mcmahon MD  02/08/23

## 2023-02-08 NOTE — CONSULTS
"[facility] consulted regarding alcohol use.  Patient evaluated in room [address], sitter stepped out for evaluation.  Patient is awake, RIB, pleasant and cooperative, soft-spoken.  He is alert and oriented x 4, pleasant and cooperative.  He reports coming to the hospital as he thought he may have a seizure.      He is a 46 yo S/AAM, currently resides with his mom; has no children.  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  Cites family, friends, and sponsor as support also has several friends in recovery.  He reports his father was an alcoholic.    He reports started drinking in high school; alcohol use gradually increased.  He obtained sobriety about 4-5 years ago however relapsed in November and has been drinking heavily since anywhere from one to two pints of gin daily.  He reports he has become harder and harder to hide his drinking. He reports history of black outs while drinking.  He reports his father passed away in march of last year and he has had car trouble which have been stressors. He reports anxiety and depression related to same and feels he has \"spiraled out\" of control.  Past NEGRITA tx includes [facility] in [place], [facility], and .  He has a sponsor whom he speaks with frequently along with several friends in recovery.  He reports past history of DTs, tremors, and sweats when withdrawing.  CIWA scores have been low, VSS.      He currently denies SI, plan and intention.  He reports recent thoughts of thinking he'd be better off dead but denies any planning of suicide.  He reports today \"I don't want to die\" and is able to keep self safe.  He denies past history of SI and attempts.  He denies HI.  He denies hallucinations.  He reports poor sleep and appetite.  He reports feeling depressed related to father's passing, car troubles, and other life stressors.  He rated anxiety at an 8.  He enjoys listening to music and " deep breathing to aide in anxiety.  He denies past mental health diagnoses, denies IP psych admission, and does not have a mental health provider.  He denies illicit drug use.    He is interested is possibly pursing IP treatment for NEGRITA, resources were provided.    Access will follow, consult for psychiatrist noted and informed.

## 2023-02-09 ENCOUNTER — READMISSION MANAGEMENT (OUTPATIENT)
Dept: CALL CENTER | Facility: HOSPITAL | Age: 48
End: 2023-02-09
Payer: MEDICAID

## 2023-02-09 VITALS
HEIGHT: 66 IN | WEIGHT: 204 LBS | TEMPERATURE: 98.9 F | DIASTOLIC BLOOD PRESSURE: 103 MMHG | BODY MASS INDEX: 32.78 KG/M2 | OXYGEN SATURATION: 98 % | HEART RATE: 66 BPM | RESPIRATION RATE: 16 BRPM | SYSTOLIC BLOOD PRESSURE: 149 MMHG

## 2023-02-09 PROBLEM — F10.939 ALCOHOL WITHDRAWAL: Status: RESOLVED | Noted: 2023-02-07 | Resolved: 2023-02-09

## 2023-02-09 PROBLEM — R45.851 SUICIDAL IDEATIONS: Status: RESOLVED | Noted: 2023-02-08 | Resolved: 2023-02-09

## 2023-02-09 LAB
ALBUMIN SERPL-MCNC: 4.3 G/DL (ref 3.5–5.2)
ALBUMIN/GLOB SERPL: 1.6 G/DL
ALP SERPL-CCNC: 132 U/L (ref 39–117)
ALT SERPL W P-5'-P-CCNC: 75 U/L (ref 1–41)
ANION GAP SERPL CALCULATED.3IONS-SCNC: 10.2 MMOL/L (ref 5–15)
AST SERPL-CCNC: 76 U/L (ref 1–40)
BILIRUB SERPL-MCNC: 0.5 MG/DL (ref 0–1.2)
BUN SERPL-MCNC: 13 MG/DL (ref 6–20)
BUN/CREAT SERPL: 12.4 (ref 7–25)
CALCIUM SPEC-SCNC: 8.9 MG/DL (ref 8.6–10.5)
CHLORIDE SERPL-SCNC: 101 MMOL/L (ref 98–107)
CO2 SERPL-SCNC: 23.8 MMOL/L (ref 22–29)
CREAT SERPL-MCNC: 1.05 MG/DL (ref 0.76–1.27)
DEPRECATED RDW RBC AUTO: 47.7 FL (ref 37–54)
EGFRCR SERPLBLD CKD-EPI 2021: 87 ML/MIN/1.73
ERYTHROCYTE [DISTWIDTH] IN BLOOD BY AUTOMATED COUNT: 15.8 % (ref 12.3–15.4)
GLOBULIN UR ELPH-MCNC: 2.7 GM/DL
GLUCOSE BLDC GLUCOMTR-MCNC: 150 MG/DL (ref 70–130)
GLUCOSE SERPL-MCNC: 154 MG/DL (ref 65–99)
HCT VFR BLD AUTO: 43.8 % (ref 37.5–51)
HGB BLD-MCNC: 14.7 G/DL (ref 13–17.7)
MCH RBC QN AUTO: 27.9 PG (ref 26.6–33)
MCHC RBC AUTO-ENTMCNC: 33.6 G/DL (ref 31.5–35.7)
MCV RBC AUTO: 83.3 FL (ref 79–97)
PLATELET # BLD AUTO: 270 10*3/MM3 (ref 140–450)
PMV BLD AUTO: 8.4 FL (ref 6–12)
POTASSIUM SERPL-SCNC: 3.9 MMOL/L (ref 3.5–5.2)
PROT SERPL-MCNC: 7 G/DL (ref 6–8.5)
RBC # BLD AUTO: 5.26 10*6/MM3 (ref 4.14–5.8)
SODIUM SERPL-SCNC: 135 MMOL/L (ref 136–145)
WBC NRBC COR # BLD: 3.39 10*3/MM3 (ref 3.4–10.8)

## 2023-02-09 PROCEDURE — 82962 GLUCOSE BLOOD TEST: CPT

## 2023-02-09 PROCEDURE — 80053 COMPREHEN METABOLIC PANEL: CPT | Performed by: INTERNAL MEDICINE

## 2023-02-09 PROCEDURE — 85027 COMPLETE CBC AUTOMATED: CPT | Performed by: INTERNAL MEDICINE

## 2023-02-09 PROCEDURE — 25010000002 THIAMINE PER 100 MG: Performed by: INTERNAL MEDICINE

## 2023-02-09 PROCEDURE — 63710000001 INSULIN LISPRO (HUMAN) PER 5 UNITS: Performed by: INTERNAL MEDICINE

## 2023-02-09 PROCEDURE — G0378 HOSPITAL OBSERVATION PER HR: HCPCS

## 2023-02-09 PROCEDURE — 36415 COLL VENOUS BLD VENIPUNCTURE: CPT | Performed by: INTERNAL MEDICINE

## 2023-02-09 RX ORDER — ESCITALOPRAM OXALATE 10 MG/1
10 TABLET ORAL NIGHTLY
Qty: 30 TABLET | Refills: 0 | Status: SHIPPED | OUTPATIENT
Start: 2023-02-09

## 2023-02-09 RX ORDER — SUCRALFATE 1 G/1
1 TABLET ORAL
Qty: 120 TABLET | Refills: 0 | Status: SHIPPED | OUTPATIENT
Start: 2023-02-09 | End: 2023-03-11

## 2023-02-09 RX ORDER — DIPHENOXYLATE HYDROCHLORIDE AND ATROPINE SULFATE 2.5; .025 MG/1; MG/1
1 TABLET ORAL DAILY
Qty: 30 TABLET | Refills: 0
Start: 2023-02-10 | End: 2023-03-12

## 2023-02-09 RX ORDER — PANTOPRAZOLE SODIUM 40 MG/1
40 TABLET, DELAYED RELEASE ORAL
Qty: 60 TABLET | Refills: 0 | Status: SHIPPED | OUTPATIENT
Start: 2023-02-09

## 2023-02-09 RX ADMIN — ATORVASTATIN CALCIUM 10 MG: 20 TABLET, FILM COATED ORAL at 08:10

## 2023-02-09 RX ADMIN — FOLIC ACID 1 MG: 1 TABLET ORAL at 08:10

## 2023-02-09 RX ADMIN — SUCRALFATE 1 G: 1 TABLET ORAL at 08:10

## 2023-02-09 RX ADMIN — INSULIN LISPRO 2 UNITS: 100 INJECTION, SOLUTION INTRAVENOUS; SUBCUTANEOUS at 08:46

## 2023-02-09 RX ADMIN — AMLODIPINE BESYLATE 5 MG: 5 TABLET ORAL at 08:10

## 2023-02-09 RX ADMIN — LORAZEPAM 1 MG: 1 TABLET ORAL at 05:57

## 2023-02-09 RX ADMIN — THIAMINE HYDROCHLORIDE 300 MG: 100 INJECTION, SOLUTION INTRAMUSCULAR; INTRAVENOUS at 05:57

## 2023-02-09 RX ADMIN — Medication 3 ML: at 08:10

## 2023-02-09 RX ADMIN — Medication 1 TABLET: at 08:10

## 2023-02-09 RX ADMIN — SUCRALFATE 1 G: 1 TABLET ORAL at 11:14

## 2023-02-09 RX ADMIN — PANTOPRAZOLE SODIUM 40 MG: 40 TABLET, DELAYED RELEASE ORAL at 08:10

## 2023-02-09 NOTE — DISCHARGE SUMMARY
[facility] Medicine Services  DISCHARGE SUMMARY    Patient Name: Zuhair Jade Jr.  : 1975  MRN: [id number]    Date of Admission: 2023  9:10 AM  Date of Discharge: 2023   Primary Care Physician: Nena Leggett PA    Consults     Date and Time Order Name Status Description    2023  6:27 AM Inpatient Psychiatrist Consult Completed     2023  2:15 PM Inpatient Gastroenterology Consult Completed     2023  1:55 PM Inpatient Gastroenterology Consult Completed     2023  1:16 PM A (on-call MD unless specified) Details            Hospital Course       Active Hospital Problems    Diagnosis  POA   • Esophageal pain [K22.89]  Yes   • Chest pain, atypical [R07.89]  Yes   • Alcohol dependence (HCC) [F10.20]  Yes   • Diabetes mellitus (HCC) [E11.9]  Yes   • GERD (gastroesophageal reflux disease) [K21.9]  Yes   • HTN (hypertension) [I10]  Yes   • Obese [E66.9]  Yes      Resolved Hospital Problems    Diagnosis Date Resolved POA   • **Alcohol withdrawal (HCC) [F10.939] 2023 Yes   • Suicidal ideations [R45.851] 2023 Not Applicable      Chief Complaint:  Alcohol withdrawal and atypical chest pain     HPI:  Zuhair Jade Jr. is a 47 y.o. male presents the hospital with complaint of moderate intermittent substernal chest pain that is worse with swallowing food and with drinking alcohol.  He reports he has been drinking significant amounts of gin all throughout the day for several months since the death of his father.  He says he is trying to quit and notes he is having tremors.  He feels he is going through withdrawal and wishes to be sober.  He is interested in resources for sobriety.  He denies any NSAID use but does note he has chronic GERD.  He uses a PPI intermittently.  Chest pain is not worsened with exertion per report.    Hospital Course:  Zuhair Jade Jr. is a 47 y.o. male presents with alcohol withdrawal and atypical chest pain.  Patient had negative troponin and negative  D-dimer.  His chest pain was in the esophageal region and was worse when swallowing.  I consulted gastroenterology to evaluate and we both feel patient could possibly have esophagitis or ulcer related to alcohol use.  Patient has been started on empiric PPI and Carafate per GI recommendation.  Most importantly he needs to stop drinking so that he no longer irritates his esophagus and stomach.    Patient was monitored in the hospital under alcohol withdrawal protocol.  He received Ativan and his withdrawal has now relatively resolved.  He received thiamine and vitamins.  He was provided with resources for sobriety and states he is motivated to go to a alcohol rehab program.    Patient did briefly mention some possible suicidal ideation.  He was seen by psychiatry consult team and promised safety and says he is not actively suicidal at this time.  Suicidal precautions were discontinued.  Patient was started on low-dose Lexapro per psychiatry recommendations.  He has had no concerning behavior.  He agrees to return to the hospital or call a suicide helpline if he has any further thoughts.    Patient now feels he is stable to discharge from the hospital today.  He agrees to follow-up with his primary care provider within 1 week, alcohol treatment plan, and also I recommend outpatient psychiatrist.  Patient appears he medically stable to discharge.    Please note that his blood pressure has been somewhat elevated but when he initially presented his amlodipine was decreased while he was acutely ill.  I am returning him to his home amlodipine dose and recommend primary care to recheck blood pressure before any other titration.    At the time of discharge patient was told to take all medications as prescribed, keep all follow-up appointments, and call their doctor or return to the hospital with any worsening or concerning symptoms.    Please note that this note was made using Dragon voice recognition software            Day  of Discharge     Subjective: Feels better.  Patient feels that alcohol withdrawal has resolved.  No suicidal ideation reported.  Patient requesting discharge home.  He plans to go to an alcohol treatment program.  No further esophageal pain on current GI medicines.    ROS:  No current fevers or chills  No current shortness of breath or cough  No current nausea, vomiting, or diarrhea  No current chest pain or palpitations    Vital Signs:   Temp:  [98.2 °F (36.8 °C)-99.1 °F (37.3 °C)] 98.9 °F (37.2 °C)  Heart Rate:  [66-95] 66  Resp:  [16-18] 16  BP: (149-166)/(103-112) 149/103     Physical Exam:   Constitutional:Awake, alert, NAD  HENT: NCAT, mucous membranes moist, neck supple  Respiratory: No cough or wheezing, respiratory effort normal, nonlabored breathing   Cardiovascular: Rate is now regular, normal radial pulses  Musculoskeletal: Obese BMI is 32  Psychiatric:  Calm affect, cooperative, conversational  Neurologic: No slurred speech or facial droop, follows commands  Skin: No rashes or jaundice, warm    Pertinent  and/or Most Recent Results     Results from last 7 days   Lab Units 02/09/23  0607 02/08/23  0619 02/07/23  0939   WBC 10*3/mm3 3.39* 3.58 4.55   HEMOGLOBIN g/dL 14.7 13.6 14.5   HEMATOCRIT % 43.8 41.5 44.5   PLATELETS 10*3/mm3 270 287 317   SODIUM mmol/L 135* 137 145   POTASSIUM mmol/L 3.9 4.1 3.8   CHLORIDE mmol/L 101 101 108*   CO2 mmol/L 23.8 25.0 26.0   BUN mg/dL 13 16 20   CREATININE mg/dL 1.05 1.08 1.15   GLUCOSE mg/dL 154* 100* 152*   CALCIUM mg/dL 8.9 8.3* 8.9     Results from last 7 days   Lab Units 02/09/23  0607 02/08/23  0619 02/07/23  0939   BILIRUBIN mg/dL 0.5 0.3 0.2   ALK PHOS U/L 132* 110 117   ALT (SGPT) U/L 75* 69* 43*   AST (SGOT) U/L 76* 97* 46*           Invalid input(s): TG, LDLCALC, LDLREALC  Results from last 7 days   Lab Units 02/08/23  0619 02/07/23  1935 02/07/23  1143 02/07/23  0939   TSH uIU/mL 1.510  --   --   --    HEMOGLOBIN A1C %  --   --   --  8.60*   HSTROP T ng/L   --  8 12 13           Imaging Results (All)     Procedure Component Value Units Date/Time    CT Abdomen Pelvis With Contrast [[id number]] Collected: 02/08/23 1102     Updated: 02/08/23 1148    Narrative:      Exam: CT of the abdomen and pelvis with contrast.     HISTORY: Weight loss, heavy drinker, concern for ulcer or esophagitis,  elevated LFTs.     TECHNIQUE: Radiation dose reduction techniques were utilized, including  automated exposure control and exposure modulation based on body size.   3 mm images were obtained through the abdomen and pelvis with IV  contrast. Portal venous phase imaging performed through the abdomen and  nephrographic phase imaging through the abdomen and pelvis. Image  quality is somewhat degraded by overlying monitors/motion artifact. The  upper abdomen is not fully included in the field-of-view on the initial  phase.     COMPARISON: None     FINDINGS:  Lower chest: Dependent bibasilar atelectasis and/or scarring. The heart  size is within normal limits..     Liver: The liver measures top normal. Lobular contour with slight  widening of the fissures suggesting chronic liver disease. Diffuse  low-attenuation suggesting hepatic steatosis. Recommend correlation with  laboratory values and continued surveillance as clinically indicated.     Biliary tract: Within normal limits.     Spleen: Within normal limits.     Pancreas: Within normal limits.     Adrenals: Indeterminant 2.1 cm left adrenal nodule. Thickening right  adrenal gland.     Kidneys:  Within normal limits.     Bowel:  No obstruction. Colonic diverticulosis. Correlation with  endoscopy could be considered for further evaluation as indicated.  Normal appendix.     Peritoneum: Within normal limits.     Vasculature:    Within normal limits.     Lymph Nodes:  Within normal limits.                                                            Pelvic organs: Within normal limits.     Abdominal/Pelvic Wall: Within normal limits.     Bones:  Degenerative changes thoracolumbar spine and pelvis. Nonspecific  sclerotic foci in the pelvis favoring bone islands.       Impression:      1.  No acute intra-abdominal or pelvic process on this somewhat motion  limited exam.  2.  Morphologic changes of chronic liver disease with hepatic steatosis.  3.  Indeterminate 2.1 cm left adrenal nodule favoring an adenoma but can  better be characterized with MRI if there are no priors available for  comparison.  4.  Colonic diverticulosis.  5.  Please see above for additional findings/recommendations.            This report was finalized on 2/8/2023 11:44 AM by Dr. Matilda Marshall M.D.       XR Chest 1 View [[id number]] Collected: 02/07/23 1019     Updated: 02/07/23 1024    Narrative:      Portable chest radiograph     HISTORY:Chest pain     TECHNIQUE: Single AP portable radiograph of the chest     COMPARISON:None       Impression:      FINDINGS AND IMPRESSION:  No pulmonary consolidation, pleural effusion or pneumothorax is seen.  Cardiac silhouette is at the upper limits of normal to borderline  enlarged.     This report was finalized on 2/7/2023 10:21 AM by Dr. Vipin Roque M.D.               Discharge Details        Discharge Medications      New Medications      Instructions Start Date   escitalopram 10 MG tablet  Commonly known as: LEXAPRO   10 mg, Oral, Nightly      multivitamin tablet tablet   1 tablet, Oral, Daily, OTC   Start Date: February 10, 2023     pantoprazole 40 MG EC tablet  Commonly known as: PROTONIX  Replaces: omeprazole 20 MG capsule   40 mg, Oral, 2 Times Daily Before Meals      sucralfate 1 g tablet  Commonly known as: CARAFATE   1 g, Oral, 4 Times Daily Before Meals & Nightly         Continue These Medications      Instructions Start Date   amLODIPine-atorvastatin 10-10 MG per tablet  Commonly known as: CADUET   1 tablet, Oral, Daily      metFORMIN 500 MG tablet  Commonly known as: GLUCOPHAGE   1 tablet, Oral, Every 12 Hours Scheduled         Stop  These Medications    omeprazole 20 MG capsule  Commonly known as: priLOSEC  Replaced by: pantoprazole 40 MG EC tablet            No Known Allergies      Discharge Disposition:  Home or Self Care    Diet:  Hospital:  Diet Order   Procedures   • Diet: Diabetic Diets; Consistent Carbohydrate; Safe Tray; Texture: Regular Texture (IDDSI 7); Fluid Consistency: Thin (IDDSI 0)       Activity:  Activity Instructions     Activity as Tolerated                 CODE STATUS:    Code Status and Medical Interventions:   Ordered at: 02/07/23 1402     Level Of Support Discussed With:    Patient     Code Status (Patient has no pulse and is not breathing):    CPR (Attempt to Resuscitate)     Medical Interventions (Patient has pulse or is breathing):    Full Support         Additional Instructions for the Follow-ups that You Need to Schedule     Discharge Follow-up with PCP   As directed       Currently Documented PCP:    Nena Leggett PA    PCP Phone Number:    [phone]     Follow Up Details: Call today and schedule primary care provider follow-up within 1 week for general hospital follow-up and to continue to discuss sobriety         Discharge Follow-up with Specified Provider: Recommend follow-up in alcohol treatment program as discussed.  Please call today to schedule with the information provided   As directed      To: Recommend follow-up in alcohol treatment program as discussed.  Please call today to schedule with the information provided         Discharge Follow-up with Specified Provider: Recommend outpatient psychiatry follow-up for depression   As directed      To: Recommend outpatient psychiatry follow-up for depression            Follow-up Information     Nena Leggett PA .    Specialty: Internal Medicine  Why: Call today and schedule primary care provider follow-up within 1 week for general hospital follow-up and to continue to discuss sobriety  Contact information:  [facility] [facility]  [id number]  [phone]                             Bubba Mcmahon MD  02/09/23      Time Spent on Discharge:  I spent greater than 30 minutes on this discharge activity which included: face-to-face encounter with the patient, reviewing the data in the system, coordination of the care with the nursing staff as well as consultants, documentation, and entering orders.

## 2023-02-09 NOTE — PROGRESS NOTES
Case Management Discharge Note      Final Note: Home with mother         Selected Continued Care - Discharged on 2/9/2023 Admission date: 2/7/2023 - Discharge disposition: Home or Self Care    Destination    No services have been selected for the patient.              Durable Medical Equipment    No services have been selected for the patient.              Dialysis/Infusion    No services have been selected for the patient.              Home Medical Care    No services have been selected for the patient.              Therapy    No services have been selected for the patient.              Community Resources    No services have been selected for the patient.              Community & DME    No services have been selected for the patient.                  Transportation Services  Private: Car    Final Discharge Disposition Code: 01 - home or self-care

## 2023-02-09 NOTE — PLAN OF CARE
Problem: Adult Inpatient Plan of Care  Goal: Plan of Care Review  Outcome: Ongoing, Progressing  Goal: Patient-Specific Goal (Individualized)  Outcome: Ongoing, Progressing  Goal: Absence of Hospital-Acquired Illness or Injury  Outcome: Ongoing, Progressing  Goal: Optimal Comfort and Wellbeing  Outcome: Ongoing, Progressing  Goal: Readiness for Transition of Care  Outcome: Ongoing, Progressing     Problem: Alcohol Withdrawal  Goal: Alcohol Withdrawal Symptom Control  Outcome: Ongoing, Progressing     Problem: Acute Neurologic Deterioration (Alcohol Withdrawal)  Goal: Optimal Neurologic Function  Outcome: Ongoing, Progressing     Problem: Substance Misuse (Alcohol Withdrawal)  Goal: Readiness for Change Identified  Outcome: Ongoing, Progressing   Goal Outcome Evaluation:

## 2023-02-09 NOTE — PLAN OF CARE
Problem: Alcohol Withdrawal  Goal: Alcohol Withdrawal Symptom Control  Outcome: Ongoing, Progressing     Problem: Acute Neurologic Deterioration (Alcohol Withdrawal)  Goal: Optimal Neurologic Function  Outcome: Ongoing, Progressing     Problem: Substance Misuse (Alcohol Withdrawal)  Goal: Readiness for Change Identified  Outcome: Ongoing, Progressing   Goal Outcome Evaluation: Patient alert x4.  No SI thoughts at this time.  Patient is noted with tremors when arms are extended.  CIWA 4. Will continue to monitor

## 2023-02-09 NOTE — NURSING NOTE
Access and psychiatrist consulted for ETOH abuse.     Patient's most recent CIWA 2. Spoke with RN and she states patient has received scheduled ativan but no prn ativan. Patient sleeping and progressing towards goals.     Access following.

## 2023-02-09 NOTE — PROGRESS NOTES
The patient is being discharged today but is reported no interest in return to residential chemical dependence treatment.

## 2023-02-10 ENCOUNTER — TRANSITIONAL CARE MANAGEMENT TELEPHONE ENCOUNTER (OUTPATIENT)
Dept: CALL CENTER | Facility: HOSPITAL | Age: 48
End: 2023-02-10
Payer: MEDICAID

## 2023-02-10 NOTE — OUTREACH NOTE
Prep Survey    Flowsheet Row Responses   [facility] facility patient discharged from? [place]   Is LACE score < 7 ? Yes   Eligibility [facility]   Date of Admission 02/07/23   Date of Discharge 02/09/23   Discharge Disposition Home or Self Care   Discharge diagnosis Alcohol withdrawal    Does the patient have one of the following disease processes/diagnoses(primary or secondary)? Other   Does the patient have Home health ordered? No   Is there a DME ordered? No   Prep survey completed? Yes          KEYON MARK - Registered Nurse

## 2023-02-10 NOTE — OUTREACH NOTE
Call Center TCM Note    Flowsheet Row Responses   [facility] patient discharged from? [place]   Does the patient have one of the following disease processes/diagnoses(primary or secondary)? Other   TCM attempt successful? No   Unsuccessful attempts Attempt 2  [Called Mother Felicity for an updated number for patient number given and updated is [phone]. Attempted to reach patient no Answer. No Updated BH verbal release on file]          Amena Leigh RN    2/10/2023, 09:40 EST

## 2023-02-10 NOTE — OUTREACH NOTE
Call Center TCM Note    Flowsheet Row Responses   [facility] patient discharged from? [place]   Does the patient have one of the following disease processes/diagnoses(primary or secondary)? Other   TCM attempt successful? No   Unsuccessful attempts Attempt 1  [No updated bh verbal on file]          Amena Leigh RN    2/10/2023, 09:14 EST

## 2023-02-11 ENCOUNTER — TRANSITIONAL CARE MANAGEMENT TELEPHONE ENCOUNTER (OUTPATIENT)
Dept: CALL CENTER | Facility: HOSPITAL | Age: 48
End: 2023-02-11
Payer: MEDICAID

## 2023-02-11 NOTE — OUTREACH NOTE
Call Center TCM Note    Flowsheet Row Responses   [facility] patient discharged from? [place]   Does the patient have one of the following disease processes/diagnoses(primary or secondary)? Other   TCM attempt successful? No   Unsuccessful attempts Attempt 3          Ingrid Paula RN    2/11/2023, 11:35 EST

## 2023-02-11 NOTE — PROGRESS NOTES
HUB to read: JANINE appt scheduled for 2/15/23 for TCM visit, pt to return call if this time doesn't work.

## 2023-07-28 ENCOUNTER — OFFICE VISIT (OUTPATIENT)
Dept: FAMILY MEDICINE CLINIC | Facility: CLINIC | Age: 48
End: 2023-07-28
Payer: MEDICAID

## 2023-07-28 ENCOUNTER — LAB (OUTPATIENT)
Dept: LAB | Facility: HOSPITAL | Age: 48
End: 2023-07-28
Payer: MEDICAID

## 2023-07-28 VITALS
DIASTOLIC BLOOD PRESSURE: 82 MMHG | HEIGHT: 67 IN | HEART RATE: 97 BPM | BODY MASS INDEX: 30.79 KG/M2 | OXYGEN SATURATION: 98 % | SYSTOLIC BLOOD PRESSURE: 128 MMHG | TEMPERATURE: 98.7 F | WEIGHT: 196.2 LBS

## 2023-07-28 DIAGNOSIS — I10 PRIMARY HYPERTENSION: ICD-10-CM

## 2023-07-28 DIAGNOSIS — D35.02 ADRENAL ADENOMA, LEFT: ICD-10-CM

## 2023-07-28 DIAGNOSIS — R74.8 ELEVATED LIVER ENZYMES: ICD-10-CM

## 2023-07-28 DIAGNOSIS — E78.2 MIXED HYPERLIPIDEMIA: ICD-10-CM

## 2023-07-28 DIAGNOSIS — F19.94 SUBSTANCE INDUCED MOOD DISORDER: ICD-10-CM

## 2023-07-28 DIAGNOSIS — F10.91 ALCOHOL USE DISORDER IN REMISSION: ICD-10-CM

## 2023-07-28 DIAGNOSIS — E29.1 HYPOGONADISM IN MALE: ICD-10-CM

## 2023-07-28 DIAGNOSIS — E66.09 CLASS 1 OBESITY DUE TO EXCESS CALORIES WITHOUT SERIOUS COMORBIDITY WITH BODY MASS INDEX (BMI) OF 30.0 TO 30.9 IN ADULT: ICD-10-CM

## 2023-07-28 DIAGNOSIS — Z11.3 SCREENING EXAMINATION FOR STD (SEXUALLY TRANSMITTED DISEASE): ICD-10-CM

## 2023-07-28 DIAGNOSIS — E11.9 TYPE 2 DIABETES MELLITUS WITHOUT COMPLICATION, WITHOUT LONG-TERM CURRENT USE OF INSULIN: Primary | ICD-10-CM

## 2023-07-28 DIAGNOSIS — Z11.4 SCREENING FOR HIV (HUMAN IMMUNODEFICIENCY VIRUS): ICD-10-CM

## 2023-07-28 PROBLEM — R07.89 CHEST PAIN, ATYPICAL: Status: RESOLVED | Noted: 2023-02-07 | Resolved: 2023-07-28

## 2023-07-28 PROBLEM — Z00.00 HEALTH CARE MAINTENANCE: Status: RESOLVED | Noted: 2018-08-30 | Resolved: 2023-07-28

## 2023-07-28 PROBLEM — K57.90 DIVERTICULOSIS: Status: ACTIVE | Noted: 2023-07-28

## 2023-07-28 PROBLEM — K22.89 ESOPHAGEAL PAIN: Status: RESOLVED | Noted: 2023-02-07 | Resolved: 2023-07-28

## 2023-07-28 LAB
ALBUMIN SERPL-MCNC: 4.2 G/DL (ref 3.5–5.2)
ALBUMIN/CREATININE RATIO, URINE: 30
ALBUMIN/GLOB SERPL: 1.7 G/DL
ALP SERPL-CCNC: 121 U/L (ref 39–117)
ALT SERPL W P-5'-P-CCNC: 27 U/L (ref 1–41)
ANION GAP SERPL CALCULATED.3IONS-SCNC: 11.6 MMOL/L (ref 5–15)
AST SERPL-CCNC: 20 U/L (ref 1–40)
BASOPHILS # BLD AUTO: 0.04 10*3/MM3 (ref 0–0.2)
BASOPHILS NFR BLD AUTO: 0.8 % (ref 0–1.5)
BILIRUB SERPL-MCNC: 0.2 MG/DL (ref 0–1.2)
BUN SERPL-MCNC: 16 MG/DL (ref 6–20)
BUN/CREAT SERPL: 15.4 (ref 7–25)
CALCIUM SPEC-SCNC: 9.3 MG/DL (ref 8.6–10.5)
CHLORIDE SERPL-SCNC: 103 MMOL/L (ref 98–107)
CHOLEST SERPL-MCNC: 106 MG/DL (ref 0–200)
CO2 SERPL-SCNC: 25.4 MMOL/L (ref 22–29)
CREAT SERPL-MCNC: 1.04 MG/DL (ref 0.76–1.27)
DEPRECATED RDW RBC AUTO: 39.8 FL (ref 37–54)
EGFRCR SERPLBLD CKD-EPI 2021: 88 ML/MIN/1.73
EOSINOPHIL # BLD AUTO: 0.14 10*3/MM3 (ref 0–0.4)
EOSINOPHIL NFR BLD AUTO: 2.8 % (ref 0.3–6.2)
ERYTHROCYTE [DISTWIDTH] IN BLOOD BY AUTOMATED COUNT: 13.8 % (ref 12.3–15.4)
EXPIRATION DATE: NORMAL
EXPIRATION DATE: NORMAL
GLOBULIN UR ELPH-MCNC: 2.5 GM/DL
GLUCOSE SERPL-MCNC: 126 MG/DL (ref 65–99)
HBA1C MFR BLD: 7.4 %
HCT VFR BLD AUTO: 42.9 % (ref 37.5–51)
HDLC SERPL-MCNC: 38 MG/DL (ref 40–60)
HGB BLD-MCNC: 14.4 G/DL (ref 13–17.7)
HIV1+2 AB SER QL: NORMAL
IMM GRANULOCYTES # BLD AUTO: 0.01 10*3/MM3 (ref 0–0.05)
IMM GRANULOCYTES NFR BLD AUTO: 0.2 % (ref 0–0.5)
LDLC SERPL CALC-MCNC: 56 MG/DL (ref 0–100)
LDLC/HDLC SERPL: 1.54 {RATIO}
LYMPHOCYTES # BLD AUTO: 1.66 10*3/MM3 (ref 0.7–3.1)
LYMPHOCYTES NFR BLD AUTO: 32.7 % (ref 19.6–45.3)
Lab: NORMAL
Lab: NORMAL
MCH RBC QN AUTO: 27 PG (ref 26.6–33)
MCHC RBC AUTO-ENTMCNC: 33.6 G/DL (ref 31.5–35.7)
MCV RBC AUTO: 80.5 FL (ref 79–97)
MONOCYTES # BLD AUTO: 0.52 10*3/MM3 (ref 0.1–0.9)
MONOCYTES NFR BLD AUTO: 10.3 % (ref 5–12)
NEUTROPHILS NFR BLD AUTO: 2.7 10*3/MM3 (ref 1.7–7)
NEUTROPHILS NFR BLD AUTO: 53.2 % (ref 42.7–76)
NRBC BLD AUTO-RTO: 0 /100 WBC (ref 0–0.2)
PLATELET # BLD AUTO: 283 10*3/MM3 (ref 140–450)
PMV BLD AUTO: 9 FL (ref 6–12)
POC CREATININE URINE: 200
POC MICROALBUMIN URINE: 30
POTASSIUM SERPL-SCNC: 3.7 MMOL/L (ref 3.5–5.2)
PROT SERPL-MCNC: 6.7 G/DL (ref 6–8.5)
RBC # BLD AUTO: 5.33 10*6/MM3 (ref 4.14–5.8)
SODIUM SERPL-SCNC: 140 MMOL/L (ref 136–145)
TESTOST SERPL-MCNC: 307 NG/DL (ref 249–836)
TRIGL SERPL-MCNC: 48 MG/DL (ref 0–150)
VLDLC SERPL-MCNC: 12 MG/DL (ref 5–40)
WBC NRBC COR # BLD: 5.07 10*3/MM3 (ref 3.4–10.8)

## 2023-07-28 PROCEDURE — 99205 OFFICE O/P NEW HI 60 MIN: CPT | Performed by: STUDENT IN AN ORGANIZED HEALTH CARE EDUCATION/TRAINING PROGRAM

## 2023-07-28 PROCEDURE — G0432 EIA HIV-1/HIV-2 SCREEN: HCPCS

## 2023-07-28 PROCEDURE — 3051F HG A1C>EQUAL 7.0%<8.0%: CPT | Performed by: STUDENT IN AN ORGANIZED HEALTH CARE EDUCATION/TRAINING PROGRAM

## 2023-07-28 PROCEDURE — 3079F DIAST BP 80-89 MM HG: CPT | Performed by: STUDENT IN AN ORGANIZED HEALTH CARE EDUCATION/TRAINING PROGRAM

## 2023-07-28 PROCEDURE — 82044 UR ALBUMIN SEMIQUANTITATIVE: CPT | Performed by: STUDENT IN AN ORGANIZED HEALTH CARE EDUCATION/TRAINING PROGRAM

## 2023-07-28 PROCEDURE — 3074F SYST BP LT 130 MM HG: CPT | Performed by: STUDENT IN AN ORGANIZED HEALTH CARE EDUCATION/TRAINING PROGRAM

## 2023-07-28 PROCEDURE — 85025 COMPLETE CBC W/AUTO DIFF WBC: CPT

## 2023-07-28 PROCEDURE — 36415 COLL VENOUS BLD VENIPUNCTURE: CPT

## 2023-07-28 PROCEDURE — 80053 COMPREHEN METABOLIC PANEL: CPT

## 2023-07-28 PROCEDURE — 83036 HEMOGLOBIN GLYCOSYLATED A1C: CPT | Performed by: STUDENT IN AN ORGANIZED HEALTH CARE EDUCATION/TRAINING PROGRAM

## 2023-07-28 PROCEDURE — 84403 ASSAY OF TOTAL TESTOSTERONE: CPT | Performed by: STUDENT IN AN ORGANIZED HEALTH CARE EDUCATION/TRAINING PROGRAM

## 2023-07-28 PROCEDURE — 80061 LIPID PANEL: CPT

## 2023-07-28 RX ORDER — AMLODIPINE BESYLATE 10 MG/1
10 TABLET ORAL DAILY
Qty: 90 TABLET | Refills: 3 | Status: SHIPPED | OUTPATIENT
Start: 2023-07-28

## 2023-07-28 RX ORDER — ATORVASTATIN CALCIUM 40 MG/1
40 TABLET, FILM COATED ORAL DAILY
Qty: 90 TABLET | Refills: 3 | Status: SHIPPED | OUTPATIENT
Start: 2023-07-28

## 2023-07-28 RX ORDER — HYDROCHLOROTHIAZIDE 25 MG/1
25 TABLET ORAL DAILY
COMMUNITY
End: 2023-07-28 | Stop reason: SDUPTHER

## 2023-07-28 RX ORDER — HYDROCHLOROTHIAZIDE 25 MG/1
25 TABLET ORAL DAILY
Qty: 90 TABLET | Refills: 3 | Status: SHIPPED | OUTPATIENT
Start: 2023-07-28

## 2023-07-28 RX ORDER — ATORVASTATIN CALCIUM 40 MG/1
40 TABLET, FILM COATED ORAL DAILY
COMMUNITY
End: 2023-07-28 | Stop reason: SDUPTHER

## 2023-07-28 RX ORDER — ESCITALOPRAM OXALATE 10 MG/1
10 TABLET ORAL NIGHTLY
Qty: 90 TABLET | Refills: 3 | Status: SHIPPED | OUTPATIENT
Start: 2023-07-28

## 2023-07-28 RX ORDER — SEMAGLUTIDE 1.34 MG/ML
0.25 INJECTION, SOLUTION SUBCUTANEOUS WEEKLY
Qty: 2 ML | Refills: 0 | Status: SHIPPED | OUTPATIENT
Start: 2023-07-28

## 2023-07-29 ENCOUNTER — PATIENT ROUNDING (BHMG ONLY) (OUTPATIENT)
Dept: FAMILY MEDICINE CLINIC | Facility: CLINIC | Age: 48
End: 2023-07-29
Payer: MEDICAID

## 2023-08-01 ENCOUNTER — TELEPHONE (OUTPATIENT)
Dept: FAMILY MEDICINE CLINIC | Facility: CLINIC | Age: 48
End: 2023-08-01

## 2023-08-01 NOTE — TELEPHONE ENCOUNTER
Caller: Zuhair Jade Jr.    Relationship: Self    Best call back number: [phone]     What was the call regarding: PATIENT STATED THAT [facility] SENT OVER A REQUEST TODAY 8/1/23 FOR A PRIOR AUTHORIZATION ON THE SEMAGLUTIDE (OZEMPIC) PRESCRIPTION     PATIENT IS REQUESTING A CALL BACK WHEN THIS PA IS APPROVED/DENIED.    PLEASE ADVISE    Event Note

## 2023-08-15 DIAGNOSIS — E11.9 TYPE 2 DIABETES MELLITUS WITHOUT COMPLICATION, WITHOUT LONG-TERM CURRENT USE OF INSULIN: Primary | ICD-10-CM

## 2023-08-15 RX ORDER — LANCETS 28 GAUGE
EACH MISCELLANEOUS
Qty: 90 EACH | Refills: 3 | Status: SHIPPED | OUTPATIENT
Start: 2023-08-15

## 2023-08-30 DIAGNOSIS — E11.9 TYPE 2 DIABETES MELLITUS WITHOUT COMPLICATION, WITHOUT LONG-TERM CURRENT USE OF INSULIN: ICD-10-CM

## 2023-08-30 RX ORDER — SEMAGLUTIDE 1.34 MG/ML
0.25 INJECTION, SOLUTION SUBCUTANEOUS WEEKLY
Qty: 2 ML | Refills: 0 | Status: SHIPPED | OUTPATIENT
Start: 2023-08-30

## 2023-10-05 ENCOUNTER — OFFICE VISIT (OUTPATIENT)
Dept: ENDOCRINOLOGY | Facility: CLINIC | Age: 48
End: 2023-10-05
Payer: MEDICAID

## 2023-10-05 VITALS
OXYGEN SATURATION: 98 % | DIASTOLIC BLOOD PRESSURE: 80 MMHG | BODY MASS INDEX: 30.22 KG/M2 | WEIGHT: 188 LBS | SYSTOLIC BLOOD PRESSURE: 132 MMHG | HEART RATE: 90 BPM | HEIGHT: 66 IN

## 2023-10-05 DIAGNOSIS — R79.89 ABNORMAL SERUM TESTOSTERONE LEVEL: ICD-10-CM

## 2023-10-05 DIAGNOSIS — D35.00 ADRENAL CORTICAL ADENOMA, UNSPECIFIED LATERALITY: Primary | ICD-10-CM

## 2023-10-05 LAB
ANION GAP SERPL CALCULATED.3IONS-SCNC: 9.2 MMOL/L (ref 5–15)
BUN SERPL-MCNC: 13 MG/DL (ref 6–20)
BUN/CREAT SERPL: 12.7 (ref 7–25)
CALCIUM SPEC-SCNC: 10 MG/DL (ref 8.6–10.5)
CHLORIDE SERPL-SCNC: 101 MMOL/L (ref 98–107)
CO2 SERPL-SCNC: 28.8 MMOL/L (ref 22–29)
CREAT SERPL-MCNC: 1.02 MG/DL (ref 0.76–1.27)
EGFRCR SERPLBLD CKD-EPI 2021: 89.5 ML/MIN/1.73
GLUCOSE SERPL-MCNC: 112 MG/DL (ref 65–99)
POTASSIUM SERPL-SCNC: 3.5 MMOL/L (ref 3.5–5.2)
SODIUM SERPL-SCNC: 139 MMOL/L (ref 136–145)

## 2023-10-05 PROCEDURE — 82088 ASSAY OF ALDOSTERONE: CPT | Performed by: INTERNAL MEDICINE

## 2023-10-05 PROCEDURE — 84244 ASSAY OF RENIN: CPT | Performed by: INTERNAL MEDICINE

## 2023-10-05 PROCEDURE — 80048 BASIC METABOLIC PNL TOTAL CA: CPT | Performed by: INTERNAL MEDICINE

## 2023-10-05 PROCEDURE — 83835 ASSAY OF METANEPHRINES: CPT | Performed by: INTERNAL MEDICINE

## 2023-10-05 NOTE — PROGRESS NOTES
Chief Complaint   Patient presents with    adrenal adenoma        New patient who is being seen in consultation regarding adrenal adenoma at the request of Won Orozco MD     HPI   Zuhair Jade Jr. is a 48 y.o. male who presents for evaluation of adrenal adenoma.    Adrenal adenoma was noted incidentally on CT scan in February 2023.  Patient was scheduled for a follow-up MRI last month but did not make this appointment.  He is willing to reschedule this.  He has not had any hormonal evaluation.  He does have a history of hypertension which she reports is well controlled.  He also reports some anxiety but is taking medication for this.    Patient inquires about monitoring for testosterone.  He reports that he previously took testosterone injections for approximately 1 month before relocating to [place].  He estimates this was approximately 6 months ago.  Of note he has had interval testing with his PCP which showed a normal testosterone level.  He is interested in repeating this.    Past Medical History:   Diagnosis Date    Adrenal adenoma     Diabetes mellitus     Hyperlipidemia     Hypertension     Type 2 diabetes mellitus      History reviewed. No pertinent surgical history.   Family History   Problem Relation Age of Onset    Aneurysm Mother 50    Alcohol abuse Father     Diabetes Father     Hypertension Father     Pancreatitis Father     Heart disease Father     Hyperlipidemia Father     No Known Problems Half-Sister     Crohn's disease Half-Brother       Social History     Socioeconomic History    Marital status: Single   Tobacco Use    Smoking status: Never    Smokeless tobacco: Never   Vaping Use    Vaping Use: Never used   Substance and Sexual Activity    Alcohol use: Not Currently    Drug use: Never    Sexual activity: Defer      No Known Allergies   Current Outpatient Medications on File Prior to Visit   Medication Sig Dispense Refill    amLODIPine (NORVASC) 10 MG tablet Take 1 tablet by mouth Daily.  "90 tablet 3    atorvastatin (LIPITOR) 40 MG tablet Take 1 tablet by mouth Daily. 90 tablet 3    escitalopram (LEXAPRO) 10 MG tablet Take 1 tablet by mouth Every Night. 90 tablet 3    glucose blood test strip Test once daily before breakfast and as needed 90 each 3    hydroCHLOROthiazide (HYDRODIURIL) 25 MG tablet Take 1 tablet by mouth Daily. 90 tablet 3    Lancets (freestyle) lancets Test once daily before breakfast and as needed 90 each 3    metFORMIN (GLUCOPHAGE) 500 MG tablet Take 1 tablet by mouth Every 12 (Twelve) Hours.      Semaglutide,0.25 or 0.5MG/DOS, (Ozempic, 0.25 or 0.5 MG/DOSE,) 2 MG/1.5ML solution pen-injector Inject 0.25 mg under the skin into the appropriate area as directed 1 (One) Time Per Week. 2 mL 0    [DISCONTINUED] Naltrexone (VIVITROL IM) Inject 380 mg into the appropriate muscle as directed by prescriber Every 28 (Twenty-Eight) Days.       No current facility-administered medications on file prior to visit.        Review of Systems   Constitutional:  Positive for appetite change.   Gastrointestinal:  Positive for GERD.   Musculoskeletal:  Positive for neck stiffness.   Psychiatric/Behavioral:  The patient is nervous/anxious.         Vitals:    10/05/23 1359   BP: 132/80   BP Location: Left arm   Patient Position: Sitting   Cuff Size: Adult   Pulse: 90   SpO2: 98%   Weight: 85.3 kg (188 lb)   Height: 167.6 cm (66\")   Body mass index is 30.34 kg/m².     Physical Exam  Vitals reviewed.   Constitutional:       General: He is not in acute distress.     Appearance: He is obese.   Neck:      Thyroid: No thyromegaly.   Cardiovascular:      Rate and Rhythm: Normal rate and regular rhythm.   Pulmonary:      Effort: Pulmonary effort is normal.      Breath sounds: Normal breath sounds.   Neurological:      General: No focal deficit present.      Mental Status: He is alert.   Psychiatric:         Mood and Affect: Mood and affect normal.         Behavior: Behavior is cooperative.    "     Labs/Imaging  Exam: CT of the abdomen and pelvis with contrast.     HISTORY: Weight loss, heavy drinker, concern for ulcer or esophagitis,  elevated LFTs.     TECHNIQUE: Radiation dose reduction techniques were utilized, including  automated exposure control and exposure modulation based on body size.   3 mm images were obtained through the abdomen and pelvis with IV  contrast. Portal venous phase imaging performed through the abdomen and  nephrographic phase imaging through the abdomen and pelvis. Image  quality is somewhat degraded by overlying monitors/motion artifact. The  upper abdomen is not fully included in the field-of-view on the initial  phase.     COMPARISON: None     FINDINGS:  Lower chest: Dependent bibasilar atelectasis and/or scarring. The heart  size is within normal limits..     Liver: The liver measures top normal. Lobular contour with slight  widening of the fissures suggesting chronic liver disease. Diffuse  low-attenuation suggesting hepatic steatosis. Recommend correlation with  laboratory values and continued surveillance as clinically indicated.     Biliary tract: Within normal limits.     Spleen: Within normal limits.     Pancreas: Within normal limits.     Adrenals: Indeterminant 2.1 cm left adrenal nodule. Thickening right  adrenal gland.     Kidneys:  Within normal limits.     Bowel:  No obstruction. Colonic diverticulosis. Correlation with  endoscopy could be considered for further evaluation as indicated.  Normal appendix.     Peritoneum: Within normal limits.     Vasculature:    Within normal limits.     Lymph Nodes:  Within normal limits.                                                            Pelvic organs: Within normal limits.     Abdominal/Pelvic Wall: Within normal limits.     Bones: Degenerative changes thoracolumbar spine and pelvis. Nonspecific  sclerotic foci in the pelvis favoring bone islands.     IMPRESSION:  1.  No acute intra-abdominal or pelvic process on this  somewhat motion  limited exam.  2.  Morphologic changes of chronic liver disease with hepatic steatosis.  3.  Indeterminate 2.1 cm left adrenal nodule favoring an adenoma but can  better be characterized with MRI if there are no priors available for  comparison.  4.  Colonic diverticulosis.  5.  Please see above for additional findings/recommendations.            This report was finalized on 2/8/2023 11:44 AM by Dr. Matilda Marshall M.D.     Latest Reference Range & Units 07/28/23 08:24   Testosterone, Total 249.00 - 836.00 ng/dL 307.00       Assessment and Plan    Diagnoses and all orders for this visit:    1. Adrenal cortical adenoma, unspecified laterality (Primary)  -     Basic Metabolic Panel; Future  -     Metanephrines, Frac. Free, Plasma; Future  -     Aldosterone / Renin Ratio; Future  -     Cortisol - AM; Future  -     Dexamethasone Level, Serum; Future  Adenoma was noted on imaging in February 2023.  Patient was given information to reschedule MRI of the abdomen.  Discussed typical of evaluation of adrenal adenomas including evaluation of hormonal production.  We did discuss potential for Lexapro to impact metanephrine testing.  Reviewed with patient that if above testing is normal and adrenal adenoma is stable on repeat imaging, would need follow-up in 1 year.  Determine next steps after review of labs.    2. Abnormal serum testosterone level  -     Testosterone, F Eqlib & T LC / MS; Future  Patient reported history.  He states that he was treated with testosterone before relocating to [place].  He only took this for a short period of time, last injection was approximately 6 months ago.  Discussed with patient that his most recent testosterone level with primary care in July was normal.  Reviewed that testosterone therapy is not indicated in the setting of normal testosterone levels.  Repeat AM testosterone per patient request       Return in about 1 year (around 10/5/2024) for Next scheduled follow up. The  patient was instructed to contact the clinic with any interval questions or concerns.    Bettie Silva MD     Dictated Utilizing Dragon Dictation

## 2023-10-06 ENCOUNTER — PATIENT ROUNDING (BHMG ONLY) (OUTPATIENT)
Dept: ENDOCRINOLOGY | Facility: CLINIC | Age: 48
End: 2023-10-06
Payer: MEDICAID

## 2023-10-06 NOTE — PROGRESS NOTES
A [employer] message has been sent to the patient for patient rounding with [facility]

## 2023-10-09 LAB
METANEPH FREE SERPL-MCNC: 29.1 PG/ML (ref 0–88)
NORMETANEPHRINE SERPL-MCNC: 60.7 PG/ML (ref 0–218.9)

## 2023-10-11 LAB
ALDOST SERPL-MCNC: 7.7 NG/DL (ref 0–30)
ALDOST/RENIN PLAS-RTO: 5.2 {RATIO} (ref 0–30)
RENIN PLAS-CCNC: 1.49 NG/ML/HR (ref 0.17–5.38)

## 2023-10-19 DIAGNOSIS — E11.9 TYPE 2 DIABETES MELLITUS WITHOUT COMPLICATION, WITHOUT LONG-TERM CURRENT USE OF INSULIN: Primary | ICD-10-CM

## 2023-10-19 RX ORDER — SEMAGLUTIDE 1.34 MG/ML
0.5 INJECTION, SOLUTION SUBCUTANEOUS WEEKLY
Qty: 1.5 ML | Refills: 0 | Status: SHIPPED | OUTPATIENT
Start: 2023-10-19

## 2023-11-01 ENCOUNTER — OFFICE VISIT (OUTPATIENT)
Dept: FAMILY MEDICINE CLINIC | Facility: CLINIC | Age: 48
End: 2023-11-01
Payer: MEDICAID

## 2023-11-01 VITALS
HEIGHT: 66 IN | DIASTOLIC BLOOD PRESSURE: 82 MMHG | TEMPERATURE: 97.6 F | BODY MASS INDEX: 32.11 KG/M2 | SYSTOLIC BLOOD PRESSURE: 122 MMHG | WEIGHT: 199.8 LBS | HEART RATE: 84 BPM

## 2023-11-01 DIAGNOSIS — I10 HYPERTENSION, UNSPECIFIED TYPE: ICD-10-CM

## 2023-11-01 DIAGNOSIS — D35.00 ADRENAL CORTICAL ADENOMA, UNSPECIFIED LATERALITY: Primary | ICD-10-CM

## 2023-11-01 DIAGNOSIS — D35.02 ADRENAL ADENOMA, LEFT: ICD-10-CM

## 2023-11-01 DIAGNOSIS — E11.9 TYPE 2 DIABETES MELLITUS WITHOUT COMPLICATION, WITHOUT LONG-TERM CURRENT USE OF INSULIN: Primary | ICD-10-CM

## 2023-11-01 LAB
EXPIRATION DATE: ABNORMAL
HBA1C MFR BLD: 6.9 % (ref 4.5–5.7)
Lab: ABNORMAL

## 2023-11-01 RX ORDER — DEXAMETHASONE 1 MG
1 TABLET ORAL ONCE
Qty: 1 TABLET | Refills: 0 | Status: SHIPPED | OUTPATIENT
Start: 2023-11-01 | End: 2023-11-01

## 2023-11-01 RX ORDER — MULTIPLE VITAMINS W/ MINERALS TAB 9MG-400MCG
1 TAB ORAL DAILY
COMMUNITY

## 2023-11-01 RX ORDER — SEMAGLUTIDE 1.34 MG/ML
0.5 INJECTION, SOLUTION SUBCUTANEOUS WEEKLY
Qty: 1.5 ML | Refills: 0 | Status: SHIPPED | OUTPATIENT
Start: 2023-11-01

## 2023-11-01 NOTE — PROGRESS NOTES
"  Established Patient Office Visit        Subjective      Chief Complaint:  Diabetes (Would like to discuss possibly a new diabetes med.)      History of Present Illness: Zuhair Jade Jr. is a 48 y.o. male who presents for diabetes f/u.     Toelrated the 0.25 mg dose of ozempic.       Patient Active Problem List   Diagnosis    Alcohol dependence    Diabetes mellitus    HTN (hypertension)    Obese    Substance induced mood disorder    Adrenal adenoma, left    Diverticulosis    Hypogonadism in male         Current Outpatient Medications:     amLODIPine (NORVASC) 10 MG tablet, Take 1 tablet by mouth Daily., Disp: 90 tablet, Rfl: 3    atorvastatin (LIPITOR) 40 MG tablet, Take 1 tablet by mouth Daily., Disp: 90 tablet, Rfl: 3    escitalopram (LEXAPRO) 10 MG tablet, Take 1 tablet by mouth Every Night., Disp: 90 tablet, Rfl: 3    glucose blood test strip, Test once daily before breakfast and as needed, Disp: 90 each, Rfl: 3    hydroCHLOROthiazide (HYDRODIURIL) 25 MG tablet, Take 1 tablet by mouth Daily., Disp: 90 tablet, Rfl: 3    Lancets (freestyle) lancets, Test once daily before breakfast and as needed, Disp: 90 each, Rfl: 3    multivitamin with minerals tablet tablet, Take 1 tablet by mouth Daily. Take one tablet by mouth daily, Disp: , Rfl:     Semaglutide,0.25 or 0.5MG/DOS, (Ozempic, 0.25 or 0.5 MG/DOSE,) 2 MG/1.5ML solution pen-injector, Inject 0.5 mg under the skin into the appropriate area as directed 1 (One) Time Per Week., Disp: 1.5 mL, Rfl: 0       Objective     Physical Exam:   Vital Signs:   /82 (BP Location: Right arm, Patient Position: Sitting, Cuff Size: Adult)   Pulse 84   Temp 97.6 °F (36.4 °C) (Temporal)   Ht 167.6 cm (66\")   Wt 90.6 kg (199 lb 12.8 oz)   BMI 32.25 kg/m²      Physical Exam  Constitutional:       General: He is not in acute distress.     Appearance: He is not ill-appearing.   Cardiovascular:      Rate and Rhythm: Normal rate and regular rhythm.   Pulmonary:      Effort: " Pulmonary effort is normal.      Breath sounds: Normal breath sounds.   Neurological:      Mental Status: He is alert.   Psychiatric:         Thought Content: Thought content normal.            Assessment / Plan      Assessment/Plan:   Diagnoses and all orders for this visit:    1. Type 2 diabetes mellitus without complication, without long-term current use of insulin (Primary)  -     POC Glycosylated Hemoglobin (Hb A1C) 6.9   -     Semaglutide,0.25 or 0.5MG/DOS, (Ozempic, 0.25 or 0.5 MG/DOSE,) 2 MG/1.5ML solution pen-injector; Inject 0.5 mg under the skin into the appropriate area as directed 1 (One) Time Per Week.  Dispense: 1.5 mL; Refill: 0  - increase to 0.5 mg.     2. Hypertension, unspecified type  - stable cont hctz     3. Adrenal adenoma, left  Assessment & Plan:  MRI pending   Following with endocrine. Has additional lab work due with them.              Follow Up:   Return in about 3 months (around 2/1/2024) for Follow-up.      MDM:     Won Orozco MD  Family Medicine - [facility]

## 2023-11-10 RX ORDER — DEXAMETHASONE 1 MG
1 TABLET ORAL ONCE
Qty: 1 TABLET | Refills: 0 | Status: SHIPPED | OUTPATIENT
Start: 2023-11-10 | End: 2023-11-10

## 2023-11-10 NOTE — TELEPHONE ENCOUNTER
Rx Refill Note    Requested Prescriptions     Pending Prescriptions Disp Refills    dexAMETHasone (DECADRON) 1 MG tablet 1 tablet 0     Sig: Take 1 tablet by mouth 1 (One) Time for 1 dose. Take at 11 PM night before lab draw at 8 AM.        Last office visit with prescribing clinician: 10/5/2023       Next office visit with prescribing clinician: 10/8/2024     {    Loly Cruz MA  11/10/23, 09:14 EST

## 2023-11-30 DIAGNOSIS — E78.2 MIXED HYPERLIPIDEMIA: ICD-10-CM

## 2023-11-30 DIAGNOSIS — E11.9 TYPE 2 DIABETES MELLITUS WITHOUT COMPLICATION, WITHOUT LONG-TERM CURRENT USE OF INSULIN: ICD-10-CM

## 2023-11-30 DIAGNOSIS — F19.94 SUBSTANCE INDUCED MOOD DISORDER: ICD-10-CM

## 2023-11-30 DIAGNOSIS — I10 PRIMARY HYPERTENSION: ICD-10-CM

## 2023-12-01 DIAGNOSIS — E11.9 TYPE 2 DIABETES MELLITUS WITHOUT COMPLICATION, WITHOUT LONG-TERM CURRENT USE OF INSULIN: Primary | ICD-10-CM

## 2023-12-01 RX ORDER — SEMAGLUTIDE 0.68 MG/ML
0.5 INJECTION, SOLUTION SUBCUTANEOUS
OUTPATIENT
Start: 2023-12-01

## 2023-12-01 RX ORDER — ATORVASTATIN CALCIUM 40 MG/1
40 TABLET, FILM COATED ORAL DAILY
Qty: 90 TABLET | Refills: 3 | Status: SHIPPED | OUTPATIENT
Start: 2023-12-01

## 2023-12-01 RX ORDER — AMLODIPINE BESYLATE 10 MG/1
10 TABLET ORAL DAILY
Qty: 90 TABLET | Refills: 3 | Status: SHIPPED | OUTPATIENT
Start: 2023-12-01

## 2023-12-01 RX ORDER — SEMAGLUTIDE 1.34 MG/ML
1 INJECTION, SOLUTION SUBCUTANEOUS WEEKLY
Qty: 3 ML | Refills: 2 | Status: SHIPPED | OUTPATIENT
Start: 2023-12-01

## 2023-12-01 RX ORDER — ESCITALOPRAM OXALATE 10 MG/1
10 TABLET ORAL NIGHTLY
Qty: 90 TABLET | Refills: 3 | Status: SHIPPED | OUTPATIENT
Start: 2023-12-01

## 2024-02-06 ENCOUNTER — OFFICE VISIT (OUTPATIENT)
Dept: FAMILY MEDICINE CLINIC | Facility: CLINIC | Age: 49
End: 2024-02-06
Payer: MEDICAID

## 2024-02-06 VITALS
HEART RATE: 88 BPM | OXYGEN SATURATION: 98 % | HEIGHT: 66 IN | DIASTOLIC BLOOD PRESSURE: 79 MMHG | TEMPERATURE: 98.4 F | RESPIRATION RATE: 18 BRPM | WEIGHT: 177.8 LBS | SYSTOLIC BLOOD PRESSURE: 120 MMHG | BODY MASS INDEX: 28.57 KG/M2

## 2024-02-06 DIAGNOSIS — I10 HYPERTENSION, UNSPECIFIED TYPE: ICD-10-CM

## 2024-02-06 DIAGNOSIS — Z23 IMMUNIZATION DUE: ICD-10-CM

## 2024-02-06 DIAGNOSIS — Z12.11 SCREENING FOR COLON CANCER: ICD-10-CM

## 2024-02-06 DIAGNOSIS — E11.9 TYPE 2 DIABETES MELLITUS WITHOUT COMPLICATION, WITHOUT LONG-TERM CURRENT USE OF INSULIN: Primary | ICD-10-CM

## 2024-02-06 DIAGNOSIS — D35.02 ADRENAL ADENOMA, LEFT: ICD-10-CM

## 2024-02-06 LAB
EXPIRATION DATE: ABNORMAL
HBA1C MFR BLD: 6.2 % (ref 4.5–5.7)
Lab: ABNORMAL

## 2024-02-06 RX ORDER — DIAZEPAM 10 MG/1
TABLET ORAL
COMMUNITY
Start: 2024-01-02 | End: 2024-02-06

## 2024-02-06 RX ORDER — DEXAMETHASONE 1 MG
TABLET ORAL
COMMUNITY
Start: 2023-11-11 | End: 2024-02-06

## 2024-02-06 NOTE — PROGRESS NOTES
"  Established Patient Office Visit        Subjective      Chief Complaint:  Diabetes (3 month follow up on diabetes, needing refills of maint. Meds, would like pneumonia vaccine)      History of Present Illness: Zuhair Jade Jr. is a 48 y.o. male who presents for f/u htn and DM II     Doing well no side effects of meds       Patient Active Problem List   Diagnosis    Alcohol dependence    Diabetes mellitus    HTN (hypertension)    Obese    Substance induced mood disorder    Adrenal adenoma, left    Diverticulosis    Hypogonadism in male         Current Outpatient Medications:     amLODIPine (NORVASC) 10 MG tablet, Take 1 tablet by mouth Daily., Disp: 90 tablet, Rfl: 3    atorvastatin (LIPITOR) 40 MG tablet, Take 1 tablet by mouth Daily., Disp: 90 tablet, Rfl: 3    escitalopram (LEXAPRO) 10 MG tablet, Take 1 tablet by mouth Every Night., Disp: 90 tablet, Rfl: 3    glucose blood test strip, Test once daily before breakfast and as needed, Disp: 90 each, Rfl: 3    hydroCHLOROthiazide (HYDRODIURIL) 25 MG tablet, Take 1 tablet by mouth Daily., Disp: 90 tablet, Rfl: 3    Lancets (freestyle) lancets, Test once daily before breakfast and as needed, Disp: 90 each, Rfl: 3    multivitamin with minerals tablet tablet, Take 1 tablet by mouth Daily. Take one tablet by mouth daily, Disp: , Rfl:     Semaglutide, 2 MG/DOSE, (OZEMPIC) 8 MG/3ML solution pen-injector, Inject 2 mg under the skin into the appropriate area as directed 1 (One) Time Per Week., Disp: 3 mL, Rfl: 11       Objective     Physical Exam:   Vital Signs:   /79   Pulse 88   Temp 98.4 °F (36.9 °C) (Temporal)   Resp 18   Ht 167.6 cm (66\")   Wt 80.6 kg (177 lb 12.8 oz)   SpO2 98%   BMI 28.70 kg/m²      Physical Exam  Constitutional:       General: He is not in acute distress.     Appearance: He is not ill-appearing.   Cardiovascular:      Rate and Rhythm: Normal rate and regular rhythm.   Pulmonary:      Effort: Pulmonary effort is normal.      Breath " sounds: Normal breath sounds.   Neurological:      Mental Status: He is alert.   Psychiatric:         Thought Content: Thought content normal.            Assessment / Plan      Assessment/Plan:   Diagnoses and all orders for this visit:    1. Type 2 diabetes mellitus without complication, without long-term current use of insulin (Primary)  Assessment & Plan:  Increase ozempic to 2 mg  Doing well  Cont healthy diet     Orders:  -     POC Glycosylated Hemoglobin (Hb A1C)  -     Semaglutide, 2 MG/DOSE, (OZEMPIC) 8 MG/3ML solution pen-injector; Inject 2 mg under the skin into the appropriate area as directed 1 (One) Time Per Week.  Dispense: 3 mL; Refill: 11    2. Immunization due  -     Pneumococcal Conjugate Vaccine 20-Valent All  -     Hepatitis B Vaccine Adult IM    3. Hypertension, unspecified type  Assessment & Plan:  At goal continue hctz and amlodipine       4. Screening for colon cancer  -     Ambulatory Referral For Screening Colonoscopy    5. Adrenal adenoma, left  Assessment & Plan:  Needs to complete dexamthasome suppression test.   F/u with endocrine 10/24              Follow Up:   Return in about 3 months (around 5/10/2024) for Wellness visit.    MDM:     Won Orozco MD  Family Medicine - [facility]

## 2024-02-06 NOTE — LETTER
[facility]  Vaccine Consent Form    Patient Name:  Zuhair Jade Jr.  Patient :  1975     Vaccine(s) Ordered    Pneumococcal Conjugate Vaccine 20-Valent All        Screening Checklist  The following questions should be completed prior to vaccination. If you answer “yes” to any question, it does not necessarily mean you should not be vaccinated. It just means we may need to clarify or ask more questions. If a question is unclear, please ask your healthcare provider to explain it.    Yes No   Any fever or moderate to severe illness today (mild illness and/or antibiotic treatment are not contraindications)?     Do you have a history of a serious reaction to any previous vaccinations, such as anaphylaxis, encephalopathy within 7 days, Guillain-[place] syndrome within 6 weeks, seizure?     Have you received any live vaccine(s) (e.g MMR, ANAMARIA) or any other vaccines in the last month (to ensure duplicate doses aren't given)?     Do you have an anaphylactic allergy to latex (DTaP, DTaP-IPV, Hep A, Hep B, MenB, RV, Td, Tdap), baker’s yeast (Hep B, HPV), polysorbates (RSV, nirsevimab, PCV 20, Rotavirrus, Tdap, Shingrix), or gelatin (ANAMARIA, MMR)?     Do you have an anaphylactic allergy to neomycin (Rabies, ANAMARIA, MMR, IPV, Hep A), polymyxin B (IPV), or streptomycin (IPV)?      Any cancer, leukemia, AIDS, or other immune system disorder? (ANAMARIA, MMR, RV)     Do you have a parent, brother, or sister with an immune system problem (if immune competence of vaccine recipient clinically verified, can proceed)? (MMR, ANAMARIA)     Any recent steroid treatments for >2 weeks, chemotherapy, or radiation treatment? (ANAMARIA, MMR)     Have you received antibody-containing blood transfusions or IVIG in the past 11 months (recommended interval is dependent on product)? (MMR, ANAMARIA)     Have you taken antiviral drugs (acyclovir, famciclovir, valacyclovir for ANAMARIA) in the last 24 or 48 hours, respectively?      Are you pregnant or planning to become  "pregnant within 1 month? (ANAMARIA, MMR, HPV, IPV, MenB, Abrexvy; For Hep B- refer to Engerix-B; For RSV - Abrysvo is indicated for 32-36 weeks of pregnancy from September to January)     For infants, have you ever been told your child has had intussusception or a medical emergency involving obstruction of the intestine (Rotavirus)? If not for an infant, can skip this question.         *Ordering Physicians/APC should be consulted if \"yes\" is checked by the patient or guardian above.  I have received, read, and understand the Vaccine Information Statement (VIS) for each vaccine ordered.  I have considered my or my child's health status as well as the health status of my close contacts.  I have taken the opportunity to discuss my vaccine questions with my or my child's health care provider.   I have requested that the ordered vaccine(s) be given to me or my child.  I understand the benefits and risks of the vaccines.  I understand that I should remain in the clinic for 15 minutes after receiving the vaccine(s).  _________________________________________________________  Signature of Patient or Parent/Legal Guardian ____________________  Date     "

## 2024-03-03 DIAGNOSIS — F19.94 SUBSTANCE INDUCED MOOD DISORDER: ICD-10-CM

## 2024-03-03 DIAGNOSIS — E78.2 MIXED HYPERLIPIDEMIA: ICD-10-CM

## 2024-03-03 DIAGNOSIS — I10 PRIMARY HYPERTENSION: ICD-10-CM

## 2024-03-04 RX ORDER — ESCITALOPRAM OXALATE 10 MG/1
10 TABLET ORAL NIGHTLY
Qty: 90 TABLET | Refills: 3 | Status: SHIPPED | OUTPATIENT
Start: 2024-03-04

## 2024-03-04 RX ORDER — HYDROCHLOROTHIAZIDE 25 MG/1
25 TABLET ORAL DAILY
Qty: 90 TABLET | Refills: 3 | Status: SHIPPED | OUTPATIENT
Start: 2024-03-04

## 2024-03-04 RX ORDER — AMLODIPINE BESYLATE 10 MG/1
10 TABLET ORAL DAILY
Qty: 90 TABLET | Refills: 3 | Status: SHIPPED | OUTPATIENT
Start: 2024-03-04

## 2024-03-04 RX ORDER — ATORVASTATIN CALCIUM 40 MG/1
40 TABLET, FILM COATED ORAL DAILY
Qty: 90 TABLET | Refills: 3 | Status: SHIPPED | OUTPATIENT
Start: 2024-03-04

## 2024-05-02 RX ORDER — SODIUM PICOSULFATE, MAGNESIUM OXIDE, AND ANHYDROUS CITRIC ACID 12; 3.5; 1 G/175ML; G/175ML; MG/175ML
350 LIQUID ORAL ONCE
Qty: 350 ML | Refills: 0 | Status: SHIPPED | OUTPATIENT
Start: 2024-05-02 | End: 2024-05-02

## 2024-05-09 ENCOUNTER — OUTSIDE FACILITY SERVICE (OUTPATIENT)
Dept: GASTROENTEROLOGY | Facility: CLINIC | Age: 49
End: 2024-05-09
Payer: MEDICAID

## 2024-05-09 PROCEDURE — 45378 DIAGNOSTIC COLONOSCOPY: CPT | Performed by: INTERNAL MEDICINE

## 2024-05-15 ENCOUNTER — OFFICE VISIT (OUTPATIENT)
Dept: FAMILY MEDICINE CLINIC | Facility: CLINIC | Age: 49
End: 2024-05-15
Payer: MEDICAID

## 2024-05-15 VITALS
SYSTOLIC BLOOD PRESSURE: 116 MMHG | TEMPERATURE: 98.7 F | WEIGHT: 179.6 LBS | RESPIRATION RATE: 19 BRPM | HEART RATE: 115 BPM | BODY MASS INDEX: 28.87 KG/M2 | DIASTOLIC BLOOD PRESSURE: 84 MMHG | HEIGHT: 66 IN | OXYGEN SATURATION: 98 %

## 2024-05-15 DIAGNOSIS — D35.02 ADRENAL ADENOMA, LEFT: ICD-10-CM

## 2024-05-15 DIAGNOSIS — Z00.00 ENCOUNTER FOR ROUTINE ADULT HEALTH EXAMINATION WITHOUT ABNORMAL FINDINGS: ICD-10-CM

## 2024-05-15 DIAGNOSIS — E11.9 TYPE 2 DIABETES MELLITUS WITHOUT COMPLICATION, WITHOUT LONG-TERM CURRENT USE OF INSULIN: ICD-10-CM

## 2024-05-15 DIAGNOSIS — Z11.3 SCREENING EXAMINATION FOR STD (SEXUALLY TRANSMITTED DISEASE): ICD-10-CM

## 2024-05-15 LAB
A/C: <30
EXPIRATION DATE: ABNORMAL
EXPIRATION DATE: NORMAL
HBA1C MFR BLD: 6 % (ref 4.5–5.7)
Lab: ABNORMAL
Lab: NORMAL
POC CREATININE URINE: 300
POC MICROALBUMIN URINE: 10

## 2024-05-15 PROCEDURE — 1126F AMNT PAIN NOTED NONE PRSNT: CPT | Performed by: STUDENT IN AN ORGANIZED HEALTH CARE EDUCATION/TRAINING PROGRAM

## 2024-05-15 PROCEDURE — 83036 HEMOGLOBIN GLYCOSYLATED A1C: CPT | Performed by: STUDENT IN AN ORGANIZED HEALTH CARE EDUCATION/TRAINING PROGRAM

## 2024-05-15 PROCEDURE — 3079F DIAST BP 80-89 MM HG: CPT | Performed by: STUDENT IN AN ORGANIZED HEALTH CARE EDUCATION/TRAINING PROGRAM

## 2024-05-15 PROCEDURE — 87491 CHLMYD TRACH DNA AMP PROBE: CPT | Performed by: STUDENT IN AN ORGANIZED HEALTH CARE EDUCATION/TRAINING PROGRAM

## 2024-05-15 PROCEDURE — 3044F HG A1C LEVEL LT 7.0%: CPT | Performed by: STUDENT IN AN ORGANIZED HEALTH CARE EDUCATION/TRAINING PROGRAM

## 2024-05-15 PROCEDURE — 3074F SYST BP LT 130 MM HG: CPT | Performed by: STUDENT IN AN ORGANIZED HEALTH CARE EDUCATION/TRAINING PROGRAM

## 2024-05-15 PROCEDURE — 99396 PREV VISIT EST AGE 40-64: CPT | Performed by: STUDENT IN AN ORGANIZED HEALTH CARE EDUCATION/TRAINING PROGRAM

## 2024-05-15 PROCEDURE — 87591 N.GONORRHOEAE DNA AMP PROB: CPT | Performed by: STUDENT IN AN ORGANIZED HEALTH CARE EDUCATION/TRAINING PROGRAM

## 2024-05-15 PROCEDURE — 82044 UR ALBUMIN SEMIQUANTITATIVE: CPT | Performed by: STUDENT IN AN ORGANIZED HEALTH CARE EDUCATION/TRAINING PROGRAM

## 2024-05-15 NOTE — PROGRESS NOTES
Male Physical Note      Patient Name: Zuhair Jade Jr.  : 1975   MRN: [id number]     Subjective    Subjective     Chief Complaint:    Chief Complaint   Patient presents with    Annual Exam     Physical        History of Present Illness: Zuhair Jade Jr. is a 48 y.o. male who is here today for their annual health maintenance and physical.      Past Medical History, Social History, Family History and Care Team were all reviewed with patient and updated as appropriate.     Medications:     Current Outpatient Medications:     amLODIPine (NORVASC) 10 MG tablet, Take 1 tablet by mouth Daily., Disp: 90 tablet, Rfl: 3    atorvastatin (LIPITOR) 40 MG tablet, Take 1 tablet by mouth Daily., Disp: 90 tablet, Rfl: 3    escitalopram (LEXAPRO) 10 MG tablet, Take 1 tablet by mouth Every Night., Disp: 90 tablet, Rfl: 3    glucose blood test strip, Test once daily before breakfast and as needed, Disp: 90 each, Rfl: 3    hydroCHLOROthiazide 25 MG tablet, Take 1 tablet by mouth Daily., Disp: 90 tablet, Rfl: 3    Lancets (freestyle) lancets, Test once daily before breakfast and as needed, Disp: 90 each, Rfl: 3    multivitamin with minerals tablet tablet, Take 1 tablet by mouth Daily. Take one tablet by mouth daily, Disp: , Rfl:     Semaglutide, 2 MG/DOSE, (OZEMPIC) 8 MG/3ML solution pen-injector, Inject 2 mg under the skin into the appropriate area as directed 1 (One) Time Per Week., Disp: 3 mL, Rfl: 11    Allergies:   No Known Allergies    Immunizations:  Tdap up-to-date  Colorectal Screening:     Last Completed Colonoscopy            COLORECTAL CANCER SCREENING (COLONOSCOPY - Every 5 Years) Next due on 2024  Colonoscopy, Scan                    Diet/Physical activity: Discussed    Depression: PHQ-2 Depression Screening  Little interest or pleasure in doing things? 0-->not at all   Feeling down, depressed, or hopeless? 0-->not at all   PHQ-2 Total Score 0       Objective   Objective     Physical  "Exam:  Vital Signs:   Vitals:    05/15/24 1107   BP: 116/84   BP Location: Left arm   Patient Position: Sitting   Cuff Size: Adult   Pulse: 115   Resp: 19   Temp: 98.7 °F (37.1 °C)   TempSrc: Temporal   SpO2: 98%   Weight: 81.5 kg (179 lb 9.6 oz)   Height: 167.6 cm (66\")   PainSc: 0-No pain     Body mass index is 28.99 kg/m².     Physical Exam  Constitutional:       General: He is not in acute distress.     Appearance: He is not ill-appearing.   Cardiovascular:      Rate and Rhythm: Normal rate and regular rhythm.   Pulmonary:      Effort: Pulmonary effort is normal.      Breath sounds: Normal breath sounds.   Neurological:      Mental Status: He is alert.   Psychiatric:         Thought Content: Thought content normal.     Oropharynx within normal limits    Procedures    Assessment / Plan      Assessment/Plan:   Diagnoses and all orders for this visit:    1. Encounter for routine adult health examination without abnormal findings  -     CBC (No Diff); Future  -     Comprehensive Metabolic Panel; Future  -     Lipid Panel; Future  -     Cancel: Microalbumin / Creatinine Urine Ratio - Urine, Clean Catch; Future  -     Chlamydia trachomatis, Neisseria gonorrhoeae, PCR - , Urine, Clean Catch; Future  -     Chlamydia trachomatis, Neisseria gonorrhoeae, PCR - , Urine, Clean Catch    2. Type 2 diabetes mellitus without complication, without long-term current use of insulin  -     POC Glycosylated Hemoglobin (Hb A1C)  -     CBC (No Diff); Future  -     Comprehensive Metabolic Panel; Future  -     Lipid Panel; Future  -     Cancel: Microalbumin / Creatinine Urine Ratio - Urine, Clean Catch; Future  -     POCT microalbumin  -Continue Ozempic and healthy diet    3. Screening examination for STD (sexually transmitted disease)  -     HIV-1/O/2 Ag/Ab w Reflex; Future  -     RPR; Future  -     Ct / GC JAMIE, Pharyngeal - Swab, Oropharynx; Future  -     Ct / GC JAMIE, Pharyngeal - Swab, Oropharynx    4. Adrenal adenoma, " left  Assessment & Plan:  Follow-up with endocrine             Follow Up:   Return in about 6 months (around 11/15/2024) for Follow-up.    Healthcare Maintenance:   Health maintenance counseling included discussion of healthy diet and physical activity, Dental hygiene, and vaccinations.  Zuhair Jade Jr. voices understanding and acceptance of this advice and will call back with any further questions or concerns. AVS with preventive healthcare tips printed for patient.     Won Orozco MD  Family Medicine - [facility]

## 2024-05-17 LAB
C TRACH RRNA SPEC QL NAA+PROBE: NEGATIVE
N GONORRHOEA RRNA SPEC QL NAA+PROBE: NEGATIVE

## 2024-05-18 LAB
C TRACH RRNA NPH QL NAA+PROBE: NEGATIVE
N GONORRHOEA RRNA NPH QL NAA+PROBE: NEGATIVE

## 2024-05-28 ENCOUNTER — LAB (OUTPATIENT)
Dept: LAB | Facility: HOSPITAL | Age: 49
End: 2024-05-28
Payer: MEDICAID

## 2024-05-28 DIAGNOSIS — Z11.3 SCREENING EXAMINATION FOR STD (SEXUALLY TRANSMITTED DISEASE): ICD-10-CM

## 2024-05-28 DIAGNOSIS — E11.9 TYPE 2 DIABETES MELLITUS WITHOUT COMPLICATION, WITHOUT LONG-TERM CURRENT USE OF INSULIN: ICD-10-CM

## 2024-05-28 DIAGNOSIS — Z00.00 ENCOUNTER FOR ROUTINE ADULT HEALTH EXAMINATION WITHOUT ABNORMAL FINDINGS: ICD-10-CM

## 2024-05-28 LAB
ALBUMIN SERPL-MCNC: 4.4 G/DL (ref 3.5–5.2)
ALBUMIN/GLOB SERPL: 1.6 G/DL
ALP SERPL-CCNC: 137 U/L (ref 39–117)
ALT SERPL W P-5'-P-CCNC: 43 U/L (ref 1–41)
ANION GAP SERPL CALCULATED.3IONS-SCNC: 8.9 MMOL/L (ref 5–15)
AST SERPL-CCNC: 28 U/L (ref 1–40)
BILIRUB SERPL-MCNC: 0.3 MG/DL (ref 0–1.2)
BUN SERPL-MCNC: 23 MG/DL (ref 6–20)
BUN/CREAT SERPL: 19 (ref 7–25)
CALCIUM SPEC-SCNC: 10.3 MG/DL (ref 8.6–10.5)
CHLORIDE SERPL-SCNC: 104 MMOL/L (ref 98–107)
CHOLEST SERPL-MCNC: 131 MG/DL (ref 0–200)
CO2 SERPL-SCNC: 27.1 MMOL/L (ref 22–29)
CREAT SERPL-MCNC: 1.21 MG/DL (ref 0.76–1.27)
DEPRECATED RDW RBC AUTO: 45.8 FL (ref 37–54)
EGFRCR SERPLBLD CKD-EPI 2021: 72.9 ML/MIN/1.73
ERYTHROCYTE [DISTWIDTH] IN BLOOD BY AUTOMATED COUNT: 15.4 % (ref 12.3–15.4)
GLOBULIN UR ELPH-MCNC: 2.7 GM/DL
GLUCOSE SERPL-MCNC: 114 MG/DL (ref 65–99)
HCT VFR BLD AUTO: 45.4 % (ref 37.5–51)
HDLC SERPL-MCNC: 47 MG/DL (ref 40–60)
HGB BLD-MCNC: 14.8 G/DL (ref 13–17.7)
HIV 1+2 AB+HIV1 P24 AG SERPL QL IA: NORMAL
LDLC SERPL CALC-MCNC: 67 MG/DL (ref 0–100)
LDLC/HDLC SERPL: 1.42 {RATIO}
MCH RBC QN AUTO: 27.2 PG (ref 26.6–33)
MCHC RBC AUTO-ENTMCNC: 32.6 G/DL (ref 31.5–35.7)
MCV RBC AUTO: 83.3 FL (ref 79–97)
PLATELET # BLD AUTO: 331 10*3/MM3 (ref 140–450)
PMV BLD AUTO: 9.1 FL (ref 6–12)
POTASSIUM SERPL-SCNC: 4.2 MMOL/L (ref 3.5–5.2)
PROT SERPL-MCNC: 7.1 G/DL (ref 6–8.5)
RBC # BLD AUTO: 5.45 10*6/MM3 (ref 4.14–5.8)
SODIUM SERPL-SCNC: 140 MMOL/L (ref 136–145)
TRIGL SERPL-MCNC: 87 MG/DL (ref 0–150)
VLDLC SERPL-MCNC: 17 MG/DL (ref 5–40)
WBC NRBC COR # BLD AUTO: 5.07 10*3/MM3 (ref 3.4–10.8)

## 2024-05-28 PROCEDURE — 80053 COMPREHEN METABOLIC PANEL: CPT

## 2024-05-28 PROCEDURE — 80061 LIPID PANEL: CPT

## 2024-05-28 PROCEDURE — G0432 EIA HIV-1/HIV-2 SCREEN: HCPCS

## 2024-05-28 PROCEDURE — 36415 COLL VENOUS BLD VENIPUNCTURE: CPT

## 2024-05-28 PROCEDURE — 85027 COMPLETE CBC AUTOMATED: CPT

## 2024-05-28 PROCEDURE — 86592 SYPHILIS TEST NON-TREP QUAL: CPT

## 2024-05-29 LAB — RPR SER QL: NORMAL

## 2024-07-02 ENCOUNTER — OFFICE VISIT (OUTPATIENT)
Dept: FAMILY MEDICINE CLINIC | Facility: CLINIC | Age: 49
End: 2024-07-02
Payer: MEDICAID

## 2024-07-02 VITALS
BODY MASS INDEX: 29.63 KG/M2 | OXYGEN SATURATION: 98 % | HEART RATE: 75 BPM | DIASTOLIC BLOOD PRESSURE: 84 MMHG | WEIGHT: 184.4 LBS | RESPIRATION RATE: 16 BRPM | SYSTOLIC BLOOD PRESSURE: 116 MMHG | TEMPERATURE: 98.4 F | HEIGHT: 66 IN

## 2024-07-02 DIAGNOSIS — J10.1 INFLUENZA A: ICD-10-CM

## 2024-07-02 LAB
EXPIRATION DATE: ABNORMAL
EXPIRATION DATE: NORMAL
FLUAV AG NPH QL: POSITIVE
FLUBV AG NPH QL: NEGATIVE
INTERNAL CONTROL: ABNORMAL
INTERNAL CONTROL: NORMAL
Lab: ABNORMAL
Lab: NORMAL
SARS-COV-2 AG UPPER RESP QL IA.RAPID: NORMAL

## 2024-07-02 PROCEDURE — 3079F DIAST BP 80-89 MM HG: CPT | Performed by: STUDENT IN AN ORGANIZED HEALTH CARE EDUCATION/TRAINING PROGRAM

## 2024-07-02 PROCEDURE — 87804 INFLUENZA ASSAY W/OPTIC: CPT | Performed by: STUDENT IN AN ORGANIZED HEALTH CARE EDUCATION/TRAINING PROGRAM

## 2024-07-02 PROCEDURE — 3074F SYST BP LT 130 MM HG: CPT | Performed by: STUDENT IN AN ORGANIZED HEALTH CARE EDUCATION/TRAINING PROGRAM

## 2024-07-02 PROCEDURE — 3044F HG A1C LEVEL LT 7.0%: CPT | Performed by: STUDENT IN AN ORGANIZED HEALTH CARE EDUCATION/TRAINING PROGRAM

## 2024-07-02 PROCEDURE — 99213 OFFICE O/P EST LOW 20 MIN: CPT | Performed by: STUDENT IN AN ORGANIZED HEALTH CARE EDUCATION/TRAINING PROGRAM

## 2024-07-02 PROCEDURE — 1125F AMNT PAIN NOTED PAIN PRSNT: CPT | Performed by: STUDENT IN AN ORGANIZED HEALTH CARE EDUCATION/TRAINING PROGRAM

## 2024-07-02 PROCEDURE — 87426 SARSCOV CORONAVIRUS AG IA: CPT | Performed by: STUDENT IN AN ORGANIZED HEALTH CARE EDUCATION/TRAINING PROGRAM

## 2024-07-02 RX ORDER — EMTRICITABINE AND TENOFOVIR ALAFENAMIDE 200; 25 MG/1; MG/1
1 TABLET ORAL DAILY
COMMUNITY
Start: 2024-06-03

## 2024-07-02 RX ORDER — POLYETHYLENE GLYCOL 3350, SODIUM SULFATE, SODIUM CHLORIDE, POTASSIUM CHLORIDE, ASCORBIC ACID, SODIUM ASCORBATE 7.5-2.691G
KIT ORAL
COMMUNITY
Start: 2024-05-06 | End: 2024-07-02

## 2024-07-02 NOTE — PROGRESS NOTES
Established Patient Office Visit        Subjective      Chief Complaint:  Eye Pain (Having sharp eye pain-right side, tiredness,headaches, stiff neck and sinus area. It's been 1 week and has not subsided. He said that the pain actually switched from his right side once and shifted to left. Had a fever for a couple of days, sore throat, body aches )      History of Present Illness: Zuhair Jade Jr. is a 48 y.o. male who presents for 1 week of headache and pressure behind the eyes. +congestion to the maxillary sinus. Mild irritation to the throat. No vision change. No cough. Mild tactile fever. Had some flank pain that is now better. No burning with urination.       Patient Active Problem List   Diagnosis    Alcohol dependence    Diabetes mellitus    HTN (hypertension)    Obese    Substance induced mood disorder    Adrenal adenoma, left    Diverticulosis    Hypogonadism in male         Current Outpatient Medications:     amLODIPine (NORVASC) 10 MG tablet, Take 1 tablet by mouth Daily., Disp: 90 tablet, Rfl: 3    atorvastatin (LIPITOR) 40 MG tablet, Take 1 tablet by mouth Daily., Disp: 90 tablet, Rfl: 3    escitalopram (LEXAPRO) 10 MG tablet, Take 1 tablet by mouth Every Night., Disp: 90 tablet, Rfl: 3    glucose blood test strip, Test once daily before breakfast and as needed, Disp: 90 each, Rfl: 3    hydroCHLOROthiazide 25 MG tablet, Take 1 tablet by mouth Daily., Disp: 90 tablet, Rfl: 3    Lancets (freestyle) lancets, Test once daily before breakfast and as needed, Disp: 90 each, Rfl: 3    multivitamin with minerals tablet tablet, Take 1 tablet by mouth Daily. Take one tablet by mouth daily, Disp: , Rfl:     Semaglutide, 2 MG/DOSE, (OZEMPIC) 8 MG/3ML solution pen-injector, Inject 2 mg under the skin into the appropriate area as directed 1 (One) Time Per Week., Disp: 3 mL, Rfl: 11    Descovy 200-25 MG per tablet, Take 1 tablet by mouth Daily. (Patient not taking: Reported on 7/2/2024), Disp: , Rfl:       "  Objective     Physical Exam:   Vital Signs:   /84 (BP Location: Left arm, Patient Position: Sitting, Cuff Size: Adult)   Pulse 75   Temp 98.4 °F (36.9 °C) (Temporal)   Resp 16   Ht 167.6 cm (66\")   Wt 83.6 kg (184 lb 6.4 oz)   SpO2 98%   BMI 29.76 kg/m²      Physical Exam  Constitutional:       General: He is not in acute distress.     Appearance: He is not ill-appearing.   Cardiovascular:      Rate and Rhythm: Normal rate and regular rhythm.   Pulmonary:      Effort: Pulmonary effort is normal.      Breath sounds: Normal breath sounds.   Neurological:      Mental Status: He is alert.   Psychiatric:         Thought Content: Thought content normal.            Assessment / Plan      Assessment/Plan:   Diagnoses and all orders for this visit:    1. Influenza A  -     POCT Influenza A/B  -     POCT SARS-CoV-2 Antigen DESHAWN         Out of tamiflu window.  Symptomatic care follow-up for worsening.  Okay for ibuprofen/Tylenol Cepacol lozenge    Follow Up:   No follow-ups on file.    MDM:     Won Orozco MD  Family Medicine - [facility]  "

## 2024-10-08 ENCOUNTER — OFFICE VISIT (OUTPATIENT)
Dept: ENDOCRINOLOGY | Facility: CLINIC | Age: 49
End: 2024-10-08
Payer: MEDICAID

## 2024-10-08 VITALS
DIASTOLIC BLOOD PRESSURE: 80 MMHG | SYSTOLIC BLOOD PRESSURE: 122 MMHG | HEART RATE: 96 BPM | HEIGHT: 66 IN | WEIGHT: 186 LBS | BODY MASS INDEX: 29.89 KG/M2

## 2024-10-08 DIAGNOSIS — E27.8 MASS OF ADRENAL GLAND: Primary | ICD-10-CM

## 2024-10-08 LAB
ANION GAP SERPL CALCULATED.3IONS-SCNC: 8 MMOL/L (ref 5–15)
BUN SERPL-MCNC: 27 MG/DL (ref 6–20)
BUN/CREAT SERPL: 16.8 (ref 7–25)
CALCIUM SPEC-SCNC: 10.1 MG/DL (ref 8.6–10.5)
CHLORIDE SERPL-SCNC: 102 MMOL/L (ref 98–107)
CO2 SERPL-SCNC: 29 MMOL/L (ref 22–29)
CREAT SERPL-MCNC: 1.61 MG/DL (ref 0.76–1.27)
EGFRCR SERPLBLD CKD-EPI 2021: 51.5 ML/MIN/1.73
GLUCOSE SERPL-MCNC: 137 MG/DL (ref 65–99)
POTASSIUM SERPL-SCNC: 4 MMOL/L (ref 3.5–5.2)
SODIUM SERPL-SCNC: 139 MMOL/L (ref 136–145)

## 2024-10-08 PROCEDURE — 83835 ASSAY OF METANEPHRINES: CPT | Performed by: INTERNAL MEDICINE

## 2024-10-08 PROCEDURE — 3079F DIAST BP 80-89 MM HG: CPT | Performed by: INTERNAL MEDICINE

## 2024-10-08 PROCEDURE — 3074F SYST BP LT 130 MM HG: CPT | Performed by: INTERNAL MEDICINE

## 2024-10-08 PROCEDURE — 84244 ASSAY OF RENIN: CPT | Performed by: INTERNAL MEDICINE

## 2024-10-08 PROCEDURE — 3044F HG A1C LEVEL LT 7.0%: CPT | Performed by: INTERNAL MEDICINE

## 2024-10-08 PROCEDURE — 99213 OFFICE O/P EST LOW 20 MIN: CPT | Performed by: INTERNAL MEDICINE

## 2024-10-08 PROCEDURE — 82088 ASSAY OF ALDOSTERONE: CPT | Performed by: INTERNAL MEDICINE

## 2024-10-08 PROCEDURE — 80048 BASIC METABOLIC PNL TOTAL CA: CPT | Performed by: INTERNAL MEDICINE

## 2024-10-08 RX ORDER — DEXAMETHASONE 1 MG
1 TABLET ORAL ONCE
Qty: 1 TABLET | Refills: 0 | Status: SHIPPED | OUTPATIENT
Start: 2024-10-08 | End: 2024-10-08

## 2024-10-08 NOTE — PROGRESS NOTES
"Chief Complaint   Patient presents with    Adrenal Problem        HPI   Zuhair Jade Jr. is a 49 y.o. male had concerns including Adrenal Problem.      Patient reports no significant health changes in the interim since last visit.  He did discontinue descovy as he reports he did not feel well while taking this medication.  He also reports some adjustments to his diabetes medications.  He reports diabetes is well-controlled.  He reports that he did not complete repeat imaging.    The following portions of the patient's history were reviewed and updated as appropriate: allergies, current medications, and past social history.    Review of Systems   Constitutional:  Negative for unexpected weight gain.   Gastrointestinal:  Negative for nausea and vomiting.      /80   Pulse 96   Ht 167.6 cm (65.98\")   Wt 84.4 kg (186 lb)   BMI 30.04 kg/m²      Physical Exam      Constitutional:  well developed; well nourished  no acute distress   ENT/Thyroid: not examined   Eyes: Conjunctiva: clear   Respiratory:  breathing is unlabored  clear to auscultation bilaterally   Cardiovascular:  regular rate and rhythm   Chest:  Not performed.   Abdomen: Not performed.   : Not performed.   Musculoskeletal: Not performed   Skin: not performed.   Neuro: mental status, speech normal   Psych: mood and affect are within normal limits       Labs/Imaging   Latest Reference Range & Units 10/05/23 14:29   Sodium 136 - 145 mmol/L 139   Potassium 3.5 - 5.2 mmol/L 3.5   Chloride 98 - 107 mmol/L 101   CO2 22.0 - 29.0 mmol/L 28.8   Anion Gap 5.0 - 15.0 mmol/L 9.2   BUN 6 - 20 mg/dL 13   Creatinine 0.76 - 1.27 mg/dL 1.02   BUN/Creatinine Ratio 7.0 - 25.0  12.7   eGFR >60.0 mL/min/1.73 89.5   Glucose 65 - 99 mg/dL 112 (H)   Calcium 8.6 - 10.5 mg/dL 10.0   Aldosterone 0.0 - 30.0 ng/dL 7.7   Renin Activity 0.167 - 5.380 ng/mL/hr 1.494   Aldosterone/Renin Ratio 0.0 - 30.0  5.2   Metanephrine 0.0 - 88.0 pg/mL 29.1   Normetanephrine 0.0 - 218.9 pg/mL " 60.7   (H): Data is abnormally high  Cortisol following dexamethasone 0.9 in November 2023    Diagnoses and all orders for this visit:    1. Mass of adrenal gland (Primary)  -     Basic Metabolic Panel  -     Aldosterone / Renin Ratio  -     Metanephrines, Frac. Free, Plasma  -     Cortisol - AM; Future  -     Dexamethasone Level, Serum; Future  Adenoma was noted on imaging in February 2023.  Screening for her mental abnormalities was normal in October/November 2023.  Patient did not complete repeat imaging the MRI as instructed last visit.  Reviewed importance of follow-up evaluation.  Discussed typical of evaluation of adrenal adenomas including evaluation of hormonal production.    Plan for repeat hormonal evaluation.  Once BMP is resulted, will order MRI abdomen. MRI recommended per radiology report given indeterminate characteristics on prior CT in 2023.    Other orders  -     dexAMETHasone (DECADRON) 1 MG tablet; Take 1 tablet by mouth 1 (One) Time for 1 dose. Take 1 mg at 11 PM night before lab draw at 8 AM  Dispense: 1 tablet; Refill: 0         Return in about 1 year (around 10/8/2025). The patient was instructed to contact the clinic with any interval questions or concerns.    Electronically signed by: Bettie Silva MD   Endocrinologist    Dictated Utilizing Dragon Dictation

## 2024-10-14 LAB
ALDOST SERPL-MCNC: 8 NG/DL (ref 0–30)
ALDOST/RENIN PLAS-RTO: 6.9 {RATIO} (ref 0–30)
METANEPH FREE SERPL-MCNC: 38.7 PG/ML (ref 0–88)
NORMETANEPHRINE SERPL-MCNC: 75.5 PG/ML (ref 0–244)
RENIN PLAS-CCNC: 1.16 NG/ML/HR (ref 0.17–5.38)

## 2024-10-25 ENCOUNTER — LAB (OUTPATIENT)
Dept: LAB | Facility: HOSPITAL | Age: 49
End: 2024-10-25
Payer: MEDICAID

## 2024-10-25 DIAGNOSIS — E27.8 MASS OF ADRENAL GLAND: ICD-10-CM

## 2024-10-25 LAB
ANION GAP SERPL CALCULATED.3IONS-SCNC: 11 MMOL/L (ref 5–15)
BUN SERPL-MCNC: 22 MG/DL (ref 6–20)
BUN/CREAT SERPL: 20.6 (ref 7–25)
CALCIUM SPEC-SCNC: 9.7 MG/DL (ref 8.6–10.5)
CHLORIDE SERPL-SCNC: 101 MMOL/L (ref 98–107)
CO2 SERPL-SCNC: 25 MMOL/L (ref 22–29)
CORTIS AM PEAK SERPL-MCNC: 0.67 MCG/DL
CREAT SERPL-MCNC: 1.07 MG/DL (ref 0.76–1.27)
EGFRCR SERPLBLD CKD-EPI 2021: 84 ML/MIN/1.73
GLUCOSE SERPL-MCNC: 123 MG/DL (ref 65–99)
POTASSIUM SERPL-SCNC: 4.3 MMOL/L (ref 3.5–5.2)
SODIUM SERPL-SCNC: 137 MMOL/L (ref 136–145)

## 2024-10-25 PROCEDURE — 80048 BASIC METABOLIC PNL TOTAL CA: CPT | Performed by: INTERNAL MEDICINE

## 2024-10-25 PROCEDURE — 82533 TOTAL CORTISOL: CPT

## 2024-10-25 PROCEDURE — 80299 QUANTITATIVE ASSAY DRUG: CPT

## 2024-11-01 ENCOUNTER — HOSPITAL ENCOUNTER (OUTPATIENT)
Dept: MRI IMAGING | Facility: HOSPITAL | Age: 49
Discharge: HOME | End: 2024-11-01
Payer: MEDICAID

## 2024-11-01 DIAGNOSIS — E27.8 MASS OF ADRENAL GLAND: ICD-10-CM

## 2024-11-01 PROCEDURE — A9577 INJ MULTIHANCE: HCPCS | Performed by: INTERNAL MEDICINE

## 2024-11-01 PROCEDURE — 0 GADOBENATE DIMEGLUMINE 529 MG/ML SOLUTION: Performed by: INTERNAL MEDICINE

## 2024-11-01 PROCEDURE — 74183 MRI ABD W/O CNTR FLWD CNTR: CPT

## 2024-11-01 RX ADMIN — GADOBENATE DIMEGLUMINE 14 ML: 529 INJECTION, SOLUTION INTRAVENOUS at 09:35

## 2024-11-06 LAB — DEXAMETHASONE SERPL-MCNC: 441 NG/DL

## 2024-12-16 ENCOUNTER — OFFICE VISIT (OUTPATIENT)
Dept: FAMILY MEDICINE CLINIC | Facility: CLINIC | Age: 49
End: 2024-12-16
Payer: MEDICAID

## 2024-12-16 VITALS
HEIGHT: 65 IN | SYSTOLIC BLOOD PRESSURE: 124 MMHG | BODY MASS INDEX: 30.35 KG/M2 | WEIGHT: 182.2 LBS | OXYGEN SATURATION: 98 % | TEMPERATURE: 98.4 F | RESPIRATION RATE: 18 BRPM | DIASTOLIC BLOOD PRESSURE: 76 MMHG | HEART RATE: 100 BPM

## 2024-12-16 DIAGNOSIS — Z23 IMMUNIZATION DUE: ICD-10-CM

## 2024-12-16 DIAGNOSIS — I10 PRIMARY HYPERTENSION: ICD-10-CM

## 2024-12-16 DIAGNOSIS — E11.9 TYPE 2 DIABETES MELLITUS WITHOUT COMPLICATION, WITHOUT LONG-TERM CURRENT USE OF INSULIN: ICD-10-CM

## 2024-12-16 LAB
EXPIRATION DATE: ABNORMAL
HBA1C MFR BLD: 6.2 % (ref 4.5–5.7)
Lab: ABNORMAL

## 2024-12-16 PROCEDURE — 3044F HG A1C LEVEL LT 7.0%: CPT | Performed by: STUDENT IN AN ORGANIZED HEALTH CARE EDUCATION/TRAINING PROGRAM

## 2024-12-16 PROCEDURE — 83036 HEMOGLOBIN GLYCOSYLATED A1C: CPT | Performed by: STUDENT IN AN ORGANIZED HEALTH CARE EDUCATION/TRAINING PROGRAM

## 2024-12-16 PROCEDURE — 90471 IMMUNIZATION ADMIN: CPT | Performed by: STUDENT IN AN ORGANIZED HEALTH CARE EDUCATION/TRAINING PROGRAM

## 2024-12-16 PROCEDURE — 3074F SYST BP LT 130 MM HG: CPT | Performed by: STUDENT IN AN ORGANIZED HEALTH CARE EDUCATION/TRAINING PROGRAM

## 2024-12-16 PROCEDURE — 3078F DIAST BP <80 MM HG: CPT | Performed by: STUDENT IN AN ORGANIZED HEALTH CARE EDUCATION/TRAINING PROGRAM

## 2024-12-16 PROCEDURE — 99214 OFFICE O/P EST MOD 30 MIN: CPT | Performed by: STUDENT IN AN ORGANIZED HEALTH CARE EDUCATION/TRAINING PROGRAM

## 2024-12-16 PROCEDURE — 90656 IIV3 VACC NO PRSV 0.5 ML IM: CPT | Performed by: STUDENT IN AN ORGANIZED HEALTH CARE EDUCATION/TRAINING PROGRAM

## 2024-12-16 PROCEDURE — 1125F AMNT PAIN NOTED PAIN PRSNT: CPT | Performed by: STUDENT IN AN ORGANIZED HEALTH CARE EDUCATION/TRAINING PROGRAM

## 2024-12-16 RX ORDER — DEXAMETHASONE 0.5 MG/1
TABLET ORAL
COMMUNITY
Start: 2024-10-08 | End: 2024-12-16

## 2024-12-16 NOTE — PROGRESS NOTES
"  Established Patient Office Visit        Subjective      Chief Complaint:  Diabetes (Follow up on diabetes )      History of Present Illness: Zuhair Jade Jr. is a 49 y.o. male who presents for diabetes. Doing well. No side effects      Patient Active Problem List   Diagnosis   • Alcohol dependence   • Diabetes mellitus   • HTN (hypertension)   • Obese   • Substance induced mood disorder   • Adrenal adenoma, left   • Diverticulosis   • Hypogonadism in male         Current Outpatient Medications:   •  amLODIPine (NORVASC) 10 MG tablet, Take 1 tablet by mouth Daily., Disp: 90 tablet, Rfl: 3  •  atorvastatin (LIPITOR) 40 MG tablet, Take 1 tablet by mouth Daily., Disp: 90 tablet, Rfl: 3  •  escitalopram (LEXAPRO) 10 MG tablet, Take 1 tablet by mouth Every Night., Disp: 90 tablet, Rfl: 3  •  glucose blood test strip, Test once daily before breakfast and as needed, Disp: 90 each, Rfl: 3  •  hydroCHLOROthiazide 25 MG tablet, Take 1 tablet by mouth Daily., Disp: 90 tablet, Rfl: 3  •  Lancets (freestyle) lancets, Test once daily before breakfast and as needed, Disp: 90 each, Rfl: 3  •  multivitamin with minerals tablet tablet, Take 1 tablet by mouth Daily. Take one tablet by mouth daily, Disp: , Rfl:   •  Semaglutide, 2 MG/DOSE, (OZEMPIC) 8 MG/3ML solution pen-injector, Inject 2 mg under the skin into the appropriate area as directed 1 (One) Time Per Week., Disp: 3 mL, Rfl: 11       Objective     Physical Exam:   Vital Signs:   /76 (BP Location: Right arm, Patient Position: Sitting, Cuff Size: Adult)   Pulse 100   Temp 98.4 °F (36.9 °C) (Temporal)   Resp 18   Ht 165.1 cm (65\")   Wt 82.6 kg (182 lb 3.2 oz)   SpO2 98%   BMI 30.32 kg/m²      Physical Exam  Constitutional:       General: He is not in acute distress.     Appearance: He is not ill-appearing.   Cardiovascular:      Rate and Rhythm: Normal rate and regular rhythm.   Pulmonary:      Effort: Pulmonary effort is normal.      Breath sounds: Normal breath " sounds.   Neurological:      Mental Status: He is alert.   Psychiatric:         Thought Content: Thought content normal.            Assessment / Plan      Assessment/Plan:   Diagnoses and all orders for this visit:    1. Primary hypertension  Assessment & Plan:  Cont hctz and amlodipine      2. Type 2 diabetes mellitus without complication, without long-term current use of insulin  Assessment & Plan:  Cont ozempic   Healthy diet discussed     Orders:  -     POC Glycosylated Hemoglobin (Hb A1C)    3. Immunization due  -     Fluzone >6mos           Follow Up:   Return in about 6 months (around 6/16/2025) for Wellness visit.    MDM:     Won Orozco MD  Family Medicine - [facility]

## 2024-12-24 DIAGNOSIS — E11.9 TYPE 2 DIABETES MELLITUS WITHOUT COMPLICATION, WITHOUT LONG-TERM CURRENT USE OF INSULIN: ICD-10-CM

## 2024-12-26 RX ORDER — SEMAGLUTIDE 2.68 MG/ML
INJECTION, SOLUTION SUBCUTANEOUS
Qty: 3 ML | Refills: 10 | Status: SHIPPED | OUTPATIENT
Start: 2024-12-26

## 2025-01-24 DIAGNOSIS — F19.94 SUBSTANCE INDUCED MOOD DISORDER: ICD-10-CM

## 2025-01-24 DIAGNOSIS — I10 PRIMARY HYPERTENSION: ICD-10-CM

## 2025-01-24 DIAGNOSIS — E78.2 MIXED HYPERLIPIDEMIA: ICD-10-CM

## 2025-01-24 RX ORDER — ATORVASTATIN CALCIUM 40 MG/1
40 TABLET, FILM COATED ORAL DAILY
Qty: 90 TABLET | Refills: 0 | Status: SHIPPED | OUTPATIENT
Start: 2025-01-24

## 2025-01-24 RX ORDER — ESCITALOPRAM OXALATE 10 MG/1
10 TABLET ORAL NIGHTLY
Qty: 90 TABLET | Refills: 0 | Status: SHIPPED | OUTPATIENT
Start: 2025-01-24

## 2025-01-24 RX ORDER — HYDROCHLOROTHIAZIDE 25 MG/1
25 TABLET ORAL DAILY
Qty: 90 TABLET | Refills: 0 | Status: SHIPPED | OUTPATIENT
Start: 2025-01-24

## 2025-01-24 RX ORDER — AMLODIPINE BESYLATE 10 MG/1
10 TABLET ORAL DAILY
Qty: 90 TABLET | Refills: 0 | Status: SHIPPED | OUTPATIENT
Start: 2025-01-24

## 2025-02-10 DIAGNOSIS — E11.9 TYPE 2 DIABETES MELLITUS WITHOUT COMPLICATION, WITHOUT LONG-TERM CURRENT USE OF INSULIN: ICD-10-CM

## 2025-02-10 RX ORDER — SEMAGLUTIDE 2.68 MG/ML
2 INJECTION, SOLUTION SUBCUTANEOUS WEEKLY
Qty: 3 ML | Refills: 10 | Status: SHIPPED | OUTPATIENT
Start: 2025-02-10

## 2025-03-04 ENCOUNTER — TELEPHONE (OUTPATIENT)
Dept: FAMILY MEDICINE CLINIC | Facility: CLINIC | Age: 50
End: 2025-03-04

## 2025-03-04 NOTE — TELEPHONE ENCOUNTER
Caller: Zuhair Jade Jr.    Relationship to patient: Self      Best call back number: [phone] (Mobile)     Provider: DR HARTLEY     Medication PA needed:  Semaglutide, 2 MG/DOSE, (Ozempic, 2 MG/DOSE,) 8 MG/3ML solution pen-injector     Reason for call/Prior Auth: PHARMACY SAID THEY NEEDED THE INSURANCE FOR THE PATIENT FOR THE PRIOR AUTHORIZATION

## 2025-03-06 ENCOUNTER — PRIOR AUTHORIZATION (OUTPATIENT)
Dept: FAMILY MEDICINE CLINIC | Facility: CLINIC | Age: 50
End: 2025-03-06

## 2025-03-06 NOTE — TELEPHONE ENCOUNTER
PA started 03-      JASIEL WHITT   (Key: [id number])  Rx #: [id number]  Ozempic (2 MG/DOSE) 8MG/3ML pen-injectors

## 2025-04-25 DIAGNOSIS — E78.2 MIXED HYPERLIPIDEMIA: ICD-10-CM

## 2025-04-25 DIAGNOSIS — F19.94 SUBSTANCE INDUCED MOOD DISORDER: ICD-10-CM

## 2025-04-25 DIAGNOSIS — I10 PRIMARY HYPERTENSION: ICD-10-CM

## 2025-04-25 RX ORDER — ATORVASTATIN CALCIUM 40 MG/1
40 TABLET, FILM COATED ORAL DAILY
Qty: 90 TABLET | Refills: 0 | Status: SHIPPED | OUTPATIENT
Start: 2025-04-25

## 2025-04-25 RX ORDER — AMLODIPINE BESYLATE 10 MG/1
10 TABLET ORAL DAILY
Qty: 90 TABLET | Refills: 0 | Status: SHIPPED | OUTPATIENT
Start: 2025-04-25

## 2025-04-25 RX ORDER — HYDROCHLOROTHIAZIDE 25 MG/1
25 TABLET ORAL DAILY
Qty: 90 TABLET | Refills: 0 | Status: SHIPPED | OUTPATIENT
Start: 2025-04-25

## 2025-04-25 RX ORDER — ESCITALOPRAM OXALATE 10 MG/1
10 TABLET ORAL NIGHTLY
Qty: 90 TABLET | Refills: 0 | Status: SHIPPED | OUTPATIENT
Start: 2025-04-25